# Patient Record
Sex: MALE | Race: WHITE | NOT HISPANIC OR LATINO | Employment: OTHER | URBAN - METROPOLITAN AREA
[De-identification: names, ages, dates, MRNs, and addresses within clinical notes are randomized per-mention and may not be internally consistent; named-entity substitution may affect disease eponyms.]

---

## 2023-04-20 ENCOUNTER — OFFICE VISIT (OUTPATIENT)
Dept: PRIMARY CARE | Facility: CLINIC | Age: 73
End: 2023-04-20
Payer: MEDICARE

## 2023-04-20 VITALS
HEART RATE: 84 BPM | DIASTOLIC BLOOD PRESSURE: 78 MMHG | SYSTOLIC BLOOD PRESSURE: 129 MMHG | WEIGHT: 159 LBS | OXYGEN SATURATION: 98 % | RESPIRATION RATE: 16 BRPM | HEIGHT: 70 IN | BODY MASS INDEX: 22.76 KG/M2

## 2023-04-20 DIAGNOSIS — E78.5 DYSLIPIDEMIA: ICD-10-CM

## 2023-04-20 DIAGNOSIS — F17.210 CIGARETTE NICOTINE DEPENDENCE WITHOUT COMPLICATION: Primary | ICD-10-CM

## 2023-04-20 DIAGNOSIS — J44.9 COPD, MODERATE (MULTI): ICD-10-CM

## 2023-04-20 PROBLEM — F51.01 PRIMARY INSOMNIA: Chronic | Status: ACTIVE | Noted: 2023-04-20

## 2023-04-20 PROBLEM — R97.20 ELEVATED PSA: Status: ACTIVE | Noted: 2023-04-20

## 2023-04-20 PROBLEM — J30.9 ALLERGIC RHINITIS: Status: ACTIVE | Noted: 2023-04-20

## 2023-04-20 PROBLEM — J45.909 ASTHMA (HHS-HCC): Status: RESOLVED | Noted: 2023-04-20 | Resolved: 2023-04-20

## 2023-04-20 PROBLEM — N40.0 BPH (BENIGN PROSTATIC HYPERPLASIA): Status: ACTIVE | Noted: 2023-04-20

## 2023-04-20 PROBLEM — F51.01 PRIMARY INSOMNIA: Chronic | Status: RESOLVED | Noted: 2023-04-20 | Resolved: 2023-04-20

## 2023-04-20 PROBLEM — F51.01 PRIMARY INSOMNIA: Status: ACTIVE | Noted: 2023-04-20

## 2023-04-20 PROBLEM — I10 HYPERTENSION: Status: ACTIVE | Noted: 2023-04-20

## 2023-04-20 PROBLEM — R53.83 FATIGUE: Status: ACTIVE | Noted: 2023-04-20

## 2023-04-20 PROBLEM — J45.909 ASTHMA (HHS-HCC): Status: ACTIVE | Noted: 2023-04-20

## 2023-04-20 PROBLEM — R41.3 MEMORY CHANGE: Status: ACTIVE | Noted: 2023-04-20

## 2023-04-20 PROBLEM — H90.3 SENSORINEURAL HEARING LOSS, ASYMMETRICAL: Status: ACTIVE | Noted: 2023-04-20

## 2023-04-20 PROBLEM — R31.0 GROSS HEMATURIA: Status: ACTIVE | Noted: 2023-04-20

## 2023-04-20 PROCEDURE — 1159F MED LIST DOCD IN RCRD: CPT | Performed by: STUDENT IN AN ORGANIZED HEALTH CARE EDUCATION/TRAINING PROGRAM

## 2023-04-20 PROCEDURE — 3074F SYST BP LT 130 MM HG: CPT | Performed by: STUDENT IN AN ORGANIZED HEALTH CARE EDUCATION/TRAINING PROGRAM

## 2023-04-20 PROCEDURE — 99214 OFFICE O/P EST MOD 30 MIN: CPT | Performed by: STUDENT IN AN ORGANIZED HEALTH CARE EDUCATION/TRAINING PROGRAM

## 2023-04-20 PROCEDURE — 1160F RVW MEDS BY RX/DR IN RCRD: CPT | Performed by: STUDENT IN AN ORGANIZED HEALTH CARE EDUCATION/TRAINING PROGRAM

## 2023-04-20 PROCEDURE — 3078F DIAST BP <80 MM HG: CPT | Performed by: STUDENT IN AN ORGANIZED HEALTH CARE EDUCATION/TRAINING PROGRAM

## 2023-04-20 PROCEDURE — 99406 BEHAV CHNG SMOKING 3-10 MIN: CPT | Performed by: STUDENT IN AN ORGANIZED HEALTH CARE EDUCATION/TRAINING PROGRAM

## 2023-04-20 PROCEDURE — 1157F ADVNC CARE PLAN IN RCRD: CPT | Performed by: STUDENT IN AN ORGANIZED HEALTH CARE EDUCATION/TRAINING PROGRAM

## 2023-04-20 RX ORDER — BUDESONIDE AND FORMOTEROL FUMARATE DIHYDRATE 160; 4.5 UG/1; UG/1
AEROSOL RESPIRATORY (INHALATION)
COMMUNITY
End: 2024-02-07 | Stop reason: WASHOUT

## 2023-04-20 RX ORDER — ASPIRIN 81 MG/1
1 TABLET ORAL DAILY
COMMUNITY

## 2023-04-20 RX ORDER — ALBUTEROL SULFATE 90 UG/1
AEROSOL, METERED RESPIRATORY (INHALATION) EVERY 6 HOURS
COMMUNITY
Start: 2021-08-02

## 2023-04-20 RX ORDER — ATORVASTATIN CALCIUM 20 MG/1
20 TABLET, FILM COATED ORAL DAILY
COMMUNITY
End: 2023-10-26 | Stop reason: SDUPTHER

## 2023-04-20 ASSESSMENT — ENCOUNTER SYMPTOMS
DEPRESSION: 0
OCCASIONAL FEELINGS OF UNSTEADINESS: 0
LOSS OF SENSATION IN FEET: 0
SHORTNESS OF BREATH: 0
COUGH: 0
PALPITATIONS: 0
FATIGUE: 1

## 2023-04-20 ASSESSMENT — PATIENT HEALTH QUESTIONNAIRE - PHQ9
SUM OF ALL RESPONSES TO PHQ9 QUESTIONS 1 AND 2: 0
1. LITTLE INTEREST OR PLEASURE IN DOING THINGS: NOT AT ALL
2. FEELING DOWN, DEPRESSED OR HOPELESS: NOT AT ALL

## 2023-04-20 NOTE — ASSESSMENT & PLAN NOTE
PFTS in 2021- FEV1/FVC 57, FEV1 49-61 significant BDR,   Managed with Symbicort daily and PRN albuterol inhaler  No recent flares  Taking daily vicks and states it helps  Counseled on pneumonia vaccine-declines

## 2023-04-20 NOTE — ASSESSMENT & PLAN NOTE
Counseled on long term negative health impacts on health if tobacco use continued.  Reviewed options for cessation aid including patches, lozenges, Wellbutrin, Chantix.  Discussed motivational factors to improve chances for success.  Time Spent: 5  Orders:

## 2023-04-20 NOTE — PROGRESS NOTES
"Patient Name:  James Zhu  MRN:  [id number]  :  1950    Subjective   Patient ID: James Zhu is a 72 y.o. male who presents for Transfer Of Care.    HPI     71 yo male presents    R ear troubles  Painful, upper tragus has been trying to \"get it out\"   Hearing long term loss    BP a little high today     Rolls his own cigarettes  Has not tried nicotine patches  Smoking since young age    Review of Systems   Constitutional:  Positive for fatigue.   HENT:  Positive for ear pain. Negative for congestion.    Eyes:  Negative for visual disturbance.   Respiratory:  Negative for cough and shortness of breath.    Cardiovascular:  Negative for chest pain, palpitations and leg swelling.   Allergic/Immunologic: Negative for immunocompromised state.     Objective   /78   Pulse 84   Resp 16   Ht 1.778 m (5' 10\")   Wt 72.1 kg (159 lb)   SpO2 98%   BMI 22.81 kg/m²     Physical Exam  Constitutional:       Appearance: Normal appearance.   HENT:      Head: Atraumatic.        Comments: Small white head, removed      Right Ear: Tympanic membrane normal.      Left Ear: Tympanic membrane normal.   Cardiovascular:      Rate and Rhythm: Normal rate.   Pulmonary:      Effort: Pulmonary effort is normal. No respiratory distress.      Breath sounds: No wheezing.   Neurological:      General: No focal deficit present.      Mental Status: He is alert and oriented to person, place, and time.   Psychiatric:         Mood and Affect: Mood normal.         Behavior: Behavior normal.     Assessment/Plan   Problem List Items Addressed This Visit          Respiratory    COPD, moderate (CMS/HCC) (Chronic)     PFTS in - FEV1/FVC 57, FEV1 49-61 significant BDR,   Managed with Symbicort daily and PRN albuterol inhaler  No recent flares  Taking daily vicks and states it helps  Counseled on pneumonia vaccine-declines            Other    Cigarette nicotine dependence without complication - Primary     Counseled on long term " negative health impacts on health if tobacco use continued.  Reviewed options for cessation aid including patches, lozenges, Wellbutrin, Chantix.  Discussed motivational factors to improve chances for success.  Time Spent: 5  Orders:            Dyslipidemia     Continue atorvastatin 20mg

## 2023-10-26 ENCOUNTER — OFFICE VISIT (OUTPATIENT)
Dept: PRIMARY CARE | Facility: CLINIC | Age: 73
End: 2023-10-26
Payer: MEDICARE

## 2023-10-26 ENCOUNTER — HOSPITAL ENCOUNTER (OUTPATIENT)
Dept: RADIOLOGY | Facility: HOSPITAL | Age: 73
Discharge: HOME | End: 2023-10-26
Payer: MEDICARE

## 2023-10-26 VITALS
OXYGEN SATURATION: 97 % | SYSTOLIC BLOOD PRESSURE: 112 MMHG | RESPIRATION RATE: 16 BRPM | HEART RATE: 72 BPM | BODY MASS INDEX: 22.05 KG/M2 | DIASTOLIC BLOOD PRESSURE: 70 MMHG | WEIGHT: 154 LBS | HEIGHT: 70 IN

## 2023-10-26 DIAGNOSIS — E78.5 DYSLIPIDEMIA: ICD-10-CM

## 2023-10-26 DIAGNOSIS — Z98.890 S/P FOOT SURGERY, RIGHT: ICD-10-CM

## 2023-10-26 DIAGNOSIS — J44.9 COPD, MODERATE (MULTI): ICD-10-CM

## 2023-10-26 DIAGNOSIS — Z00.00 MEDICARE ANNUAL WELLNESS VISIT, SUBSEQUENT: Primary | ICD-10-CM

## 2023-10-26 DIAGNOSIS — H53.9 VISION CHANGES: ICD-10-CM

## 2023-10-26 DIAGNOSIS — R97.20 ELEVATED PSA: ICD-10-CM

## 2023-10-26 PROCEDURE — 73620 X-RAY EXAM OF FOOT: CPT | Mod: RT,FY

## 2023-10-26 PROCEDURE — 3078F DIAST BP <80 MM HG: CPT | Performed by: STUDENT IN AN ORGANIZED HEALTH CARE EDUCATION/TRAINING PROGRAM

## 2023-10-26 PROCEDURE — 3074F SYST BP LT 130 MM HG: CPT | Performed by: STUDENT IN AN ORGANIZED HEALTH CARE EDUCATION/TRAINING PROGRAM

## 2023-10-26 PROCEDURE — 1159F MED LIST DOCD IN RCRD: CPT | Performed by: STUDENT IN AN ORGANIZED HEALTH CARE EDUCATION/TRAINING PROGRAM

## 2023-10-26 PROCEDURE — G0439 PPPS, SUBSEQ VISIT: HCPCS | Performed by: STUDENT IN AN ORGANIZED HEALTH CARE EDUCATION/TRAINING PROGRAM

## 2023-10-26 PROCEDURE — 73620 X-RAY EXAM OF FOOT: CPT | Mod: RIGHT SIDE | Performed by: RADIOLOGY

## 2023-10-26 PROCEDURE — 99397 PER PM REEVAL EST PAT 65+ YR: CPT | Performed by: STUDENT IN AN ORGANIZED HEALTH CARE EDUCATION/TRAINING PROGRAM

## 2023-10-26 PROCEDURE — 99214 OFFICE O/P EST MOD 30 MIN: CPT | Performed by: STUDENT IN AN ORGANIZED HEALTH CARE EDUCATION/TRAINING PROGRAM

## 2023-10-26 PROCEDURE — 1160F RVW MEDS BY RX/DR IN RCRD: CPT | Performed by: STUDENT IN AN ORGANIZED HEALTH CARE EDUCATION/TRAINING PROGRAM

## 2023-10-26 PROCEDURE — 99406 BEHAV CHNG SMOKING 3-10 MIN: CPT | Performed by: STUDENT IN AN ORGANIZED HEALTH CARE EDUCATION/TRAINING PROGRAM

## 2023-10-26 PROCEDURE — 1170F FXNL STATUS ASSESSED: CPT | Performed by: STUDENT IN AN ORGANIZED HEALTH CARE EDUCATION/TRAINING PROGRAM

## 2023-10-26 RX ORDER — TIOTROPIUM BROMIDE INHALATION SPRAY 1.56 UG/1
1 SPRAY, METERED RESPIRATORY (INHALATION) DAILY
Qty: 1 EACH | Refills: 11 | Status: SHIPPED | OUTPATIENT
Start: 2023-10-26 | End: 2024-02-07 | Stop reason: WASHOUT

## 2023-10-26 RX ORDER — ATORVASTATIN CALCIUM 20 MG/1
20 TABLET, FILM COATED ORAL DAILY
Qty: 90 TABLET | Refills: 1 | Status: SHIPPED | OUTPATIENT
Start: 2023-10-26 | End: 2024-02-07 | Stop reason: SDUPTHER

## 2023-10-26 RX ORDER — MELOXICAM 15 MG/1
15 TABLET ORAL DAILY PRN
Qty: 90 TABLET | Refills: 0 | Status: SHIPPED | OUTPATIENT
Start: 2023-10-26 | End: 2024-03-29 | Stop reason: SDUPTHER

## 2023-10-26 ASSESSMENT — ACTIVITIES OF DAILY LIVING (ADL)
BATHING: INDEPENDENT
DRESSING: INDEPENDENT
GROCERY_SHOPPING: INDEPENDENT
TAKING_MEDICATION: INDEPENDENT
MANAGING_FINANCES: INDEPENDENT
DOING_HOUSEWORK: INDEPENDENT

## 2023-10-26 ASSESSMENT — ENCOUNTER SYMPTOMS
WHEEZING: 1
FATIGUE: 1
CARDIOVASCULAR NEGATIVE: 1
PALPITATIONS: 0
DEPRESSION: 0
CONFUSION: 0
FEVER: 0
UNEXPECTED WEIGHT CHANGE: 0
LOSS OF SENSATION IN FEET: 0
OCCASIONAL FEELINGS OF UNSTEADINESS: 0
SHORTNESS OF BREATH: 0
ARTHRALGIAS: 1

## 2023-10-26 NOTE — ASSESSMENT & PLAN NOTE
Counseled on long term negative health impacts on health if tobacco use continued.  Reviewed options for cessation aid including patches, lozenges, Wellbutrin, Chantix.  Discussed motivational factors to improve chances for success.  Time Spent: 3  Orders: none, not ready to quick

## 2023-10-26 NOTE — ASSESSMENT & PLAN NOTE
PNEUMONIA vaccine- Declined  SHINGLES vaccine- Declined  INFLUENZA vaccine-Declined  Screening tests:  Colon cancer screening--> Completed 2022, not yet due  Prostate Cancer Screening--> last ordered 2 years ago and elevated, ordered  During the course of the visit the patient was educated and counseled about age appropriate screening and preventive services. Completed preventive screenings were documented in the chart and orders were placed for outstanding screenings/procedures as documented in the Assessment and Plan.  Patient Instructions (the written plan) was given to the patient at check out.

## 2023-10-26 NOTE — PATIENT INSTRUCTIONS
Xray of foot please complete  Try mobic for pain   Ophthalmology referral for eyes  Will send in new inhaler

## 2023-10-26 NOTE — PROGRESS NOTES
"Subjective   Reason for Visit: James Zhu is an 73 y.o. male here for a Medicare Wellness visit.     Past Medical, Surgical, and Family History reviewed and updated in chart.    Reviewed all medications by prescribing practitioner or clinical pharmacist (such as prescriptions, OTCs, herbal therapies and supplements) and documented in the medical record.    HPI    72 yo male presents for medicare and several new concerns    Vision changes- given bifocals from optometry 1 year ago and can't wear did not think that they help  Blurriness has to lean back and squint to get in focus  Insurance will not pay to go back to optometry   Trouble driving due to these issues    Pain where screw in foot placed  Extreme if pressure  Limping due to pain     Feels like COPD is worsening     Patient Care Team:  Flavia Hightower DO as PCP - General (Family Medicine)  Kevyn Ludwig MD as PCP - [employer] PCP     Review of Systems   Constitutional:  Positive for fatigue. Negative for fever and unexpected weight change.   Eyes:  Negative for visual disturbance.   Respiratory:  Positive for wheezing. Negative for shortness of breath.    Cardiovascular: Negative.  Negative for chest pain, palpitations and leg swelling.   Musculoskeletal:  Positive for arthralgias and gait problem.   Allergic/Immunologic: Negative for immunocompromised state.   Psychiatric/Behavioral:  Negative for confusion.      Objective   Vitals:  /70   Pulse 72   Resp 16   Ht 1.778 m (5' 10\")   Wt 69.9 kg (154 lb)   SpO2 97%   BMI 22.10 kg/m²       Physical Exam  Constitutional:       General: He is not in acute distress.     Appearance: Normal appearance. He is not ill-appearing.   HENT:      Head: Normocephalic and atraumatic.      Right Ear: Hearing normal.      Left Ear: Hearing normal.      Mouth/Throat:      Pharynx: No oropharyngeal exudate or posterior oropharyngeal erythema.   Eyes:      Extraocular Movements: Extraocular " movements intact.   Cardiovascular:      Rate and Rhythm: Normal rate and regular rhythm.      Pulses:           Dorsalis pedis pulses are 2+ on the right side.        Posterior tibial pulses are 2+ on the right side.   Pulmonary:      Effort: Pulmonary effort is normal. No respiratory distress.   Abdominal:      Tenderness: There is no abdominal tenderness.   Musculoskeletal:      Cervical back: No tenderness.      Right lower leg: No edema.      Left lower leg: No edema.        Feet:    Feet:      Comments: Localized pain to palpation  No Rom, sensation, or strength deficits  Skin:     General: Skin is warm and dry.   Neurological:      General: No focal deficit present.      Mental Status: He is alert and oriented to person, place, and time.   Psychiatric:         Mood and Affect: Mood normal.         Behavior: Behavior normal.     Assessment/Plan   Problem List Items Addressed This Visit       COPD, moderate (CMS/HCC) (Chronic)    Current Assessment & Plan     July 2021 with FEV1/FVC 57, FEV1 49-61  Has PRN albuterol  Taking symbicort, will add respimat            Relevant Medications    tiotropium (Spiriva Respimat) 1.25 mcg/actuation inhaler    Other Relevant Orders    Pulmonary function testing (Ancillary Performed)    Follow Up In [facility] - PCP - Established    CBC and Auto Differential    Dyslipidemia (Chronic)    Relevant Medications    atorvastatin (Lipitor) 20 mg tablet    Other Relevant Orders    Lipid Panel    Basic metabolic panel    CBC and Auto Differential    Elevated PSA (Chronic)    Relevant Orders    PSA, total and free    Medicare annual wellness visit, subsequent - Primary    Current Assessment & Plan     PNEUMONIA vaccine- Declined  SHINGLES vaccine- Declined  INFLUENZA vaccine-Declined  Screening tests:  Colon cancer screening--> Completed 2022, not yet due  Prostate Cancer Screening--> last ordered 2 years ago and elevated, ordered  During the course of the visit the patient  was educated and counseled about age appropriate screening and preventive services. Completed preventive screenings were documented in the chart and orders were placed for outstanding screenings/procedures as documented in the Assessment and Plan.  Patient Instructions (the written plan) was given to the patient at check out.           S/P foot surgery, right    Relevant Medications    meloxicam (Mobic) 15 mg tablet    Other Relevant Orders    XR foot right 1-2 views     Other Visit Diagnoses       Vision changes        Relevant Orders    Referral to Ophthalmology

## 2023-10-30 ENCOUNTER — TELEPHONE (OUTPATIENT)
Dept: PRIMARY CARE | Facility: CLINIC | Age: 73
End: 2023-10-30
Payer: MEDICARE

## 2023-10-30 DIAGNOSIS — J44.9 COPD, MODERATE (MULTI): Primary | ICD-10-CM

## 2023-10-30 NOTE — TELEPHONE ENCOUNTER
Patient aware of results also patient is requesting a new inhaler to be sent in as he can not afford the one you previously sent in.

## 2023-10-30 NOTE — TELEPHONE ENCOUNTER
I spoke with patient he is aware and also stated that he can not afford the inhaler you sent in for him and he is asking for a different one to be called in.

## 2023-10-30 NOTE — TELEPHONE ENCOUNTER
----- Message from Flavia Hightower DO sent at 10/30/2023  7:41 AM EDT -----  Xray shows hardware is without complication, no fractures noted either. Trialing 15mg mobic, taking with food.

## 2024-01-30 DIAGNOSIS — J44.9 COPD, MODERATE (MULTI): ICD-10-CM

## 2024-01-31 ENCOUNTER — APPOINTMENT (OUTPATIENT)
Dept: OPHTHALMOLOGY | Facility: CLINIC | Age: 74
End: 2024-01-31
Payer: MEDICARE

## 2024-01-31 RX ORDER — UMECLIDINIUM 62.5 UG/1
1 AEROSOL, POWDER ORAL DAILY
Qty: 90 EACH | Refills: 3 | Status: SHIPPED | OUTPATIENT
Start: 2024-01-31 | End: 2024-02-07 | Stop reason: SDUPTHER

## 2024-02-07 ENCOUNTER — OFFICE VISIT (OUTPATIENT)
Dept: PRIMARY CARE | Facility: CLINIC | Age: 74
End: 2024-02-07
Payer: MEDICARE

## 2024-02-07 VITALS
RESPIRATION RATE: 16 BRPM | SYSTOLIC BLOOD PRESSURE: 122 MMHG | WEIGHT: 188 LBS | HEART RATE: 59 BPM | OXYGEN SATURATION: 98 % | DIASTOLIC BLOOD PRESSURE: 68 MMHG | HEIGHT: 70 IN | BODY MASS INDEX: 26.92 KG/M2

## 2024-02-07 DIAGNOSIS — M79.673 CHRONIC FOOT PAIN, UNSPECIFIED LATERALITY: Chronic | ICD-10-CM

## 2024-02-07 DIAGNOSIS — E78.5 DYSLIPIDEMIA: ICD-10-CM

## 2024-02-07 DIAGNOSIS — I73.9 CLAUDICATION (CMS-HCC): ICD-10-CM

## 2024-02-07 DIAGNOSIS — R68.89 COLD INTOLERANCE: Primary | ICD-10-CM

## 2024-02-07 DIAGNOSIS — Z98.890 S/P FOOT SURGERY, RIGHT: ICD-10-CM

## 2024-02-07 DIAGNOSIS — G89.29 CHRONIC FOOT PAIN, UNSPECIFIED LATERALITY: Chronic | ICD-10-CM

## 2024-02-07 DIAGNOSIS — J44.9 COPD, MODERATE (MULTI): ICD-10-CM

## 2024-02-07 PROBLEM — F17.210 CIGARETTE NICOTINE DEPENDENCE WITHOUT COMPLICATION: Chronic | Status: ACTIVE | Noted: 2023-04-20

## 2024-02-07 PROCEDURE — 99214 OFFICE O/P EST MOD 30 MIN: CPT | Performed by: STUDENT IN AN ORGANIZED HEALTH CARE EDUCATION/TRAINING PROGRAM

## 2024-02-07 PROCEDURE — 1159F MED LIST DOCD IN RCRD: CPT | Performed by: STUDENT IN AN ORGANIZED HEALTH CARE EDUCATION/TRAINING PROGRAM

## 2024-02-07 PROCEDURE — 3074F SYST BP LT 130 MM HG: CPT | Performed by: STUDENT IN AN ORGANIZED HEALTH CARE EDUCATION/TRAINING PROGRAM

## 2024-02-07 PROCEDURE — 3078F DIAST BP <80 MM HG: CPT | Performed by: STUDENT IN AN ORGANIZED HEALTH CARE EDUCATION/TRAINING PROGRAM

## 2024-02-07 PROCEDURE — 99406 BEHAV CHNG SMOKING 3-10 MIN: CPT | Performed by: STUDENT IN AN ORGANIZED HEALTH CARE EDUCATION/TRAINING PROGRAM

## 2024-02-07 PROCEDURE — 1157F ADVNC CARE PLAN IN RCRD: CPT | Performed by: STUDENT IN AN ORGANIZED HEALTH CARE EDUCATION/TRAINING PROGRAM

## 2024-02-07 PROCEDURE — 1160F RVW MEDS BY RX/DR IN RCRD: CPT | Performed by: STUDENT IN AN ORGANIZED HEALTH CARE EDUCATION/TRAINING PROGRAM

## 2024-02-07 RX ORDER — ATORVASTATIN CALCIUM 20 MG/1
20 TABLET, FILM COATED ORAL DAILY
Qty: 90 TABLET | Refills: 3 | Status: SHIPPED | OUTPATIENT
Start: 2024-02-07

## 2024-02-07 RX ORDER — UMECLIDINIUM 62.5 UG/1
1 AEROSOL, POWDER ORAL DAILY
Qty: 90 EACH | Refills: 3 | Status: SHIPPED | OUTPATIENT
Start: 2024-02-07 | End: 2024-06-07 | Stop reason: WASHOUT

## 2024-02-07 ASSESSMENT — PATIENT HEALTH QUESTIONNAIRE - PHQ9
1. LITTLE INTEREST OR PLEASURE IN DOING THINGS: NOT AT ALL
2. FEELING DOWN, DEPRESSED OR HOPELESS: NOT AT ALL
SUM OF ALL RESPONSES TO PHQ9 QUESTIONS 1 AND 2: 0

## 2024-02-07 ASSESSMENT — ENCOUNTER SYMPTOMS
SHORTNESS OF BREATH: 0
CONFUSION: 0
WHEEZING: 0
LOSS OF SENSATION IN FEET: 0
FATIGUE: 0
FACIAL ASYMMETRY: 0
OCCASIONAL FEELINGS OF UNSTEADINESS: 0
FEVER: 0
HEADACHES: 0
ARTHRALGIAS: 1
COUGH: 0
DEPRESSION: 0

## 2024-02-07 NOTE — ASSESSMENT & PLAN NOTE
Counseled on long term negative health impacts on health if tobacco use continued.  Discussed motivational factors to improve chances for success- not interested at this time  Time Spent: 3  Orders: not interested at this time with cessation

## 2024-02-07 NOTE — ASSESSMENT & PLAN NOTE
We will work on scheduling PFTs today  Maintanece- Respimat was too expensive so now on Incruse   PRN- albuterol   No recent hospitalizations, steroid use  PFTS last in 2021- Mod level   Not interested in smoking cessation

## 2024-02-07 NOTE — PROGRESS NOTES
"Patient Name:  James Zhu  MRN:  [id number]  :  1950    Subjective   Patient ID: James Zhu is a 73 y.o. male who presents for Follow-up (Can't get warm still pain in toes /).    HPI     72 yo male presents for follow up    Concern that he has trouble getting warm   He was without heat for 20 days in January and feels like issues since     Sometimes gets pain in his toes  Both sides  Intermittent   But worse with walking    Review of Systems   Constitutional:  Negative for fatigue and fever.   HENT:  Negative for congestion.    Respiratory:  Negative for cough, shortness of breath and wheezing.    Endocrine: Positive for cold intolerance. Negative for heat intolerance.   Musculoskeletal:  Positive for arthralgias.   Allergic/Immunologic: Negative for immunocompromised state.   Neurological:  Negative for facial asymmetry and headaches.   Psychiatric/Behavioral:  Negative for confusion.      Objective   /68   Pulse 59   Resp 16   Ht 1.778 m (5' 10\")   Wt 85.3 kg (188 lb)   SpO2 98%   BMI 26.98 kg/m²     Physical Exam  Constitutional:       Appearance: Normal appearance.   Cardiovascular:      Rate and Rhythm: Normal rate and regular rhythm.      Comments: Bilateral feet significant varicosities  Pulses 2/4 on DP bilaterally   Neurological:      Mental Status: He is alert and oriented to person, place, and time.      Sensory: No sensory deficit.   Psychiatric:         Mood and Affect: Mood normal.         Behavior: Behavior normal.     Assessment/Plan   Problem List Items Addressed This Visit             ICD-10-CM    COPD, moderate (CMS/HCC) (Chronic) J44.9     We will work on scheduling PFTs today  Maintanece- Respimat was too expensive so now on Incruse   PRN- albuterol   No recent hospitalizations, steroid use  PFTS last in - Mod level   Not interested in smoking cessation          Relevant Medications    umeclidinium (Incruse Ellipta) 62.5 mcg/actuation inhalation    Other Relevant Orders "    Follow Up In [facility] - PCP - Established    Dyslipidemia (Chronic) E78.5    Relevant Medications    atorvastatin (Lipitor) 20 mg tablet    Chronic foot pain (Chronic) M79.673, G89.29     Known OA following with podiatry  Mobic for this issue  Signficiant varicosities could be venous insuffiencey  With worsening with walking rule out arterial disease with SERA  Consider EMG testing if symptoms persist           Other Visit Diagnoses         Codes    Cold intolerance    -  Primary R68.89    Relevant Orders    Tsh With Reflex To Free T4 If Abnormal    Claudication (CMS/[facility])     I73.9    Relevant Orders    Vascular US ankle brachial index (SERA) without exercise

## 2024-02-07 NOTE — ASSESSMENT & PLAN NOTE
Known OA following with podiatry  Mobic for this issue  Signficiant varicosities could be venous insuffiencey  With worsening with walking rule out arterial disease with SERA  Consider EMG testing if symptoms persist

## 2024-02-21 ENCOUNTER — HOSPITAL ENCOUNTER (OUTPATIENT)
Dept: VASCULAR MEDICINE | Facility: HOSPITAL | Age: 74
Discharge: HOME | End: 2024-02-21
Payer: MEDICARE

## 2024-02-21 ENCOUNTER — HOSPITAL ENCOUNTER (OUTPATIENT)
Dept: RESPIRATORY THERAPY | Facility: HOSPITAL | Age: 74
Discharge: HOME | End: 2024-02-21
Payer: MEDICARE

## 2024-02-21 DIAGNOSIS — J44.9 COPD, MODERATE (MULTI): ICD-10-CM

## 2024-02-21 DIAGNOSIS — I73.9 CLAUDICATION (CMS-HCC): ICD-10-CM

## 2024-02-21 LAB
MGC ASCENT PFT - FEV1 - POST: 2.07
MGC ASCENT PFT - FEV1 - PRE: 1.72
MGC ASCENT PFT - FEV1 - PREDICTED: 3.02
MGC ASCENT PFT - FVC - POST: 4.42
MGC ASCENT PFT - FVC - PRE: 4.01
MGC ASCENT PFT - FVC - PREDICTED: 4.02

## 2024-02-21 PROCEDURE — 94726 PLETHYSMOGRAPHY LUNG VOLUMES: CPT | Performed by: STUDENT IN AN ORGANIZED HEALTH CARE EDUCATION/TRAINING PROGRAM

## 2024-02-21 PROCEDURE — 93922 UPR/L XTREMITY ART 2 LEVELS: CPT | Performed by: INTERNAL MEDICINE

## 2024-02-21 PROCEDURE — 94640 AIRWAY INHALATION TREATMENT: CPT

## 2024-02-21 PROCEDURE — 94729 DIFFUSING CAPACITY: CPT | Performed by: STUDENT IN AN ORGANIZED HEALTH CARE EDUCATION/TRAINING PROGRAM

## 2024-02-21 PROCEDURE — 2500000002 HC RX 250 W HCPCS SELF ADMINISTERED DRUGS (ALT 637 FOR MEDICARE OP, ALT 636 FOR OP/ED): Performed by: STUDENT IN AN ORGANIZED HEALTH CARE EDUCATION/TRAINING PROGRAM

## 2024-02-21 PROCEDURE — 94726 PLETHYSMOGRAPHY LUNG VOLUMES: CPT | Mod: 59

## 2024-02-21 PROCEDURE — 93922 UPR/L XTREMITY ART 2 LEVELS: CPT

## 2024-02-21 PROCEDURE — 94060 EVALUATION OF WHEEZING: CPT | Performed by: STUDENT IN AN ORGANIZED HEALTH CARE EDUCATION/TRAINING PROGRAM

## 2024-02-21 RX ORDER — ALBUTEROL SULFATE 90 UG/1
4 AEROSOL, METERED RESPIRATORY (INHALATION) ONCE
Status: COMPLETED | OUTPATIENT
Start: 2024-02-21 | End: 2024-02-21

## 2024-02-21 RX ADMIN — ALBUTEROL SULFATE 4 PUFF: 90 AEROSOL, METERED RESPIRATORY (INHALATION) at 15:09

## 2024-02-22 ENCOUNTER — TELEPHONE (OUTPATIENT)
Dept: PRIMARY CARE | Facility: CLINIC | Age: 74
End: 2024-02-22
Payer: MEDICARE

## 2024-02-22 NOTE — TELEPHONE ENCOUNTER
----- Message from Flavia Hightower DO sent at 2/21/2024  3:59 PM EST -----  Moderate level of COPD on testing with response to bronchodilator, he also had moderately reduced diffusion capacity.

## 2024-02-22 NOTE — TELEPHONE ENCOUNTER
----- Message from Flavia Hightower DO sent at 2/22/2024  6:59 AM EST -----  Normal SERA, this means arterial insufficiency is not the cause for his discomfort in his feet. Our next test will be on EMG to see if it is an issue with his nerves, if this is also negative then pain is secondary to his osteoarthritis.

## 2024-02-23 ENCOUNTER — OFFICE VISIT (OUTPATIENT)
Dept: UROLOGY | Facility: CLINIC | Age: 74
End: 2024-02-23
Payer: MEDICARE

## 2024-02-23 ENCOUNTER — APPOINTMENT (OUTPATIENT)
Dept: RADIOLOGY | Facility: HOSPITAL | Age: 74
End: 2024-02-23
Payer: MEDICARE

## 2024-02-23 ENCOUNTER — HOSPITAL ENCOUNTER (EMERGENCY)
Facility: HOSPITAL | Age: 74
Discharge: HOME | End: 2024-02-24
Attending: STUDENT IN AN ORGANIZED HEALTH CARE EDUCATION/TRAINING PROGRAM
Payer: MEDICARE

## 2024-02-23 DIAGNOSIS — R10.31 RIGHT LOWER QUADRANT ABDOMINAL PAIN: ICD-10-CM

## 2024-02-23 DIAGNOSIS — N40.1 BENIGN PROSTATIC HYPERPLASIA WITH LOWER URINARY TRACT SYMPTOMS, SYMPTOM DETAILS UNSPECIFIED: Primary | ICD-10-CM

## 2024-02-23 DIAGNOSIS — K30 ACID INDIGESTION: Primary | ICD-10-CM

## 2024-02-23 DIAGNOSIS — R97.20 ELEVATED PSA: ICD-10-CM

## 2024-02-23 LAB
ALBUMIN SERPL BCP-MCNC: 4.7 G/DL (ref 3.4–5)
ALP SERPL-CCNC: 85 U/L (ref 33–136)
ALT SERPL W P-5'-P-CCNC: 21 U/L (ref 10–52)
ANION GAP BLDV CALCULATED.4IONS-SCNC: 7 MMOL/L (ref 10–25)
ANION GAP SERPL CALC-SCNC: 15 MMOL/L (ref 10–20)
APTT PPP: 35 SECONDS (ref 27–38)
AST SERPL W P-5'-P-CCNC: 21 U/L (ref 9–39)
BASE EXCESS BLDV CALC-SCNC: 2.8 MMOL/L (ref -2–3)
BASOPHILS # BLD AUTO: 0.05 X10*3/UL (ref 0–0.1)
BASOPHILS NFR BLD AUTO: 0.5 %
BILIRUB SERPL-MCNC: 0.5 MG/DL (ref 0–1.2)
BNP SERPL-MCNC: 24 PG/ML (ref 0–99)
BODY TEMPERATURE: 37 DEGREES CELSIUS
BUN SERPL-MCNC: 11 MG/DL (ref 6–23)
CA-I BLDV-SCNC: 1.19 MMOL/L (ref 1.1–1.33)
CALCIUM SERPL-MCNC: 9.9 MG/DL (ref 8.6–10.3)
CARDIAC TROPONIN I PNL SERPL HS: 8 NG/L (ref 0–20)
CHLORIDE BLDV-SCNC: 105 MMOL/L (ref 98–107)
CHLORIDE SERPL-SCNC: 103 MMOL/L (ref 98–107)
CO2 SERPL-SCNC: 25 MMOL/L (ref 21–32)
CREAT SERPL-MCNC: 0.97 MG/DL (ref 0.5–1.3)
EGFRCR SERPLBLD CKD-EPI 2021: 83 ML/MIN/1.73M*2
EOSINOPHIL # BLD AUTO: 0.16 X10*3/UL (ref 0–0.4)
EOSINOPHIL NFR BLD AUTO: 1.5 %
ERYTHROCYTE [DISTWIDTH] IN BLOOD BY AUTOMATED COUNT: 13.2 % (ref 11.5–14.5)
GLUCOSE BLDV-MCNC: 97 MG/DL (ref 74–99)
GLUCOSE SERPL-MCNC: 96 MG/DL (ref 74–99)
HCO3 BLDV-SCNC: 27.2 MMOL/L (ref 22–26)
HCT VFR BLD AUTO: 48.4 % (ref 41–52)
HCT VFR BLD EST: 43 % (ref 41–52)
HGB BLD-MCNC: 16.1 G/DL (ref 13.5–17.5)
HGB BLDV-MCNC: 14.2 G/DL (ref 13.5–17.5)
IMM GRANULOCYTES # BLD AUTO: 0.09 X10*3/UL (ref 0–0.5)
IMM GRANULOCYTES NFR BLD AUTO: 0.8 % (ref 0–0.9)
INHALED O2 CONCENTRATION: 21 %
INR PPP: 1 (ref 0.9–1.1)
LACTATE BLDV-SCNC: 1.9 MMOL/L (ref 0.4–2)
LACTATE SERPL-SCNC: 4.2 MMOL/L (ref 0.4–2)
LYMPHOCYTES # BLD AUTO: 2.73 X10*3/UL (ref 0.8–3)
LYMPHOCYTES NFR BLD AUTO: 25.1 %
MAGNESIUM SERPL-MCNC: 2.11 MG/DL (ref 1.6–2.4)
MCH RBC QN AUTO: 33 PG (ref 26–34)
MCHC RBC AUTO-ENTMCNC: 33.3 G/DL (ref 32–36)
MCV RBC AUTO: 99 FL (ref 80–100)
MONOCYTES # BLD AUTO: 0.92 X10*3/UL (ref 0.05–0.8)
MONOCYTES NFR BLD AUTO: 8.5 %
NEUTROPHILS # BLD AUTO: 6.93 X10*3/UL (ref 1.6–5.5)
NEUTROPHILS NFR BLD AUTO: 63.6 %
NRBC BLD-RTO: 0 /100 WBCS (ref 0–0)
OXYHGB MFR BLDV: 66.4 % (ref 45–75)
PCO2 BLDV: 40 MM HG (ref 41–51)
PH BLDV: 7.44 PH (ref 7.33–7.43)
PLATELET # BLD AUTO: 219 X10*3/UL (ref 150–450)
PO2 BLDV: 44 MM HG (ref 35–45)
POC BILIRUBIN, URINE: NEGATIVE
POC BLOOD, URINE: ABNORMAL
POC GLUCOSE, URINE: NEGATIVE MG/DL
POC KETONES, URINE: NEGATIVE MG/DL
POC LEUKOCYTES, URINE: ABNORMAL
POC NITRITE,URINE: NEGATIVE
POC PH, URINE: 6.5 PH
POC PROTEIN, URINE: ABNORMAL MG/DL
POC SPECIFIC GRAVITY, URINE: 1.02
POC UROBILINOGEN, URINE: 0.2 EU/DL
POTASSIUM BLDV-SCNC: 3.9 MMOL/L (ref 3.5–5.3)
POTASSIUM SERPL-SCNC: 3.9 MMOL/L (ref 3.5–5.3)
PROT SERPL-MCNC: 8 G/DL (ref 6.4–8.2)
PROTHROMBIN TIME: 11.5 SECONDS (ref 9.8–12.8)
RBC # BLD AUTO: 4.88 X10*6/UL (ref 4.5–5.9)
SAO2 % BLDV: 72 % (ref 45–75)
SODIUM BLDV-SCNC: 135 MMOL/L (ref 136–145)
SODIUM SERPL-SCNC: 139 MMOL/L (ref 136–145)
WBC # BLD AUTO: 10.9 X10*3/UL (ref 4.4–11.3)

## 2024-02-23 PROCEDURE — 86901 BLOOD TYPING SEROLOGIC RH(D): CPT | Performed by: STUDENT IN AN ORGANIZED HEALTH CARE EDUCATION/TRAINING PROGRAM

## 2024-02-23 PROCEDURE — 71275 CT ANGIOGRAPHY CHEST: CPT | Performed by: RADIOLOGY

## 2024-02-23 PROCEDURE — 36415 COLL VENOUS BLD VENIPUNCTURE: CPT | Performed by: STUDENT IN AN ORGANIZED HEALTH CARE EDUCATION/TRAINING PROGRAM

## 2024-02-23 PROCEDURE — 84132 ASSAY OF SERUM POTASSIUM: CPT | Mod: 59 | Performed by: STUDENT IN AN ORGANIZED HEALTH CARE EDUCATION/TRAINING PROGRAM

## 2024-02-23 PROCEDURE — 1160F RVW MEDS BY RX/DR IN RCRD: CPT | Performed by: UROLOGY

## 2024-02-23 PROCEDURE — 1157F ADVNC CARE PLAN IN RCRD: CPT | Performed by: UROLOGY

## 2024-02-23 PROCEDURE — 83605 ASSAY OF LACTIC ACID: CPT | Performed by: STUDENT IN AN ORGANIZED HEALTH CARE EDUCATION/TRAINING PROGRAM

## 2024-02-23 PROCEDURE — 2550000001 HC RX 255 CONTRASTS: Performed by: STUDENT IN AN ORGANIZED HEALTH CARE EDUCATION/TRAINING PROGRAM

## 2024-02-23 PROCEDURE — 83735 ASSAY OF MAGNESIUM: CPT | Performed by: STUDENT IN AN ORGANIZED HEALTH CARE EDUCATION/TRAINING PROGRAM

## 2024-02-23 PROCEDURE — 99285 EMERGENCY DEPT VISIT HI MDM: CPT | Mod: 25

## 2024-02-23 PROCEDURE — 80053 COMPREHEN METABOLIC PANEL: CPT | Performed by: STUDENT IN AN ORGANIZED HEALTH CARE EDUCATION/TRAINING PROGRAM

## 2024-02-23 PROCEDURE — 71275 CT ANGIOGRAPHY CHEST: CPT

## 2024-02-23 PROCEDURE — 1159F MED LIST DOCD IN RCRD: CPT | Performed by: UROLOGY

## 2024-02-23 PROCEDURE — 96374 THER/PROPH/DIAG INJ IV PUSH: CPT

## 2024-02-23 PROCEDURE — 85610 PROTHROMBIN TIME: CPT | Performed by: STUDENT IN AN ORGANIZED HEALTH CARE EDUCATION/TRAINING PROGRAM

## 2024-02-23 PROCEDURE — 74174 CTA ABD&PLVS W/CONTRAST: CPT | Performed by: RADIOLOGY

## 2024-02-23 PROCEDURE — 85730 THROMBOPLASTIN TIME PARTIAL: CPT | Performed by: STUDENT IN AN ORGANIZED HEALTH CARE EDUCATION/TRAINING PROGRAM

## 2024-02-23 PROCEDURE — 99213 OFFICE O/P EST LOW 20 MIN: CPT | Performed by: UROLOGY

## 2024-02-23 PROCEDURE — 84484 ASSAY OF TROPONIN QUANT: CPT | Performed by: STUDENT IN AN ORGANIZED HEALTH CARE EDUCATION/TRAINING PROGRAM

## 2024-02-23 PROCEDURE — 83880 ASSAY OF NATRIURETIC PEPTIDE: CPT | Performed by: STUDENT IN AN ORGANIZED HEALTH CARE EDUCATION/TRAINING PROGRAM

## 2024-02-23 PROCEDURE — 81003 URINALYSIS AUTO W/O SCOPE: CPT | Performed by: UROLOGY

## 2024-02-23 PROCEDURE — 96361 HYDRATE IV INFUSION ADD-ON: CPT

## 2024-02-23 PROCEDURE — 84100 ASSAY OF PHOSPHORUS: CPT | Performed by: STUDENT IN AN ORGANIZED HEALTH CARE EDUCATION/TRAINING PROGRAM

## 2024-02-23 PROCEDURE — 96375 TX/PRO/DX INJ NEW DRUG ADDON: CPT

## 2024-02-23 PROCEDURE — 85025 COMPLETE CBC W/AUTO DIFF WBC: CPT | Performed by: STUDENT IN AN ORGANIZED HEALTH CARE EDUCATION/TRAINING PROGRAM

## 2024-02-23 RX ORDER — MORPHINE SULFATE 4 MG/ML
4 INJECTION, SOLUTION INTRAMUSCULAR; INTRAVENOUS ONCE
Status: COMPLETED | OUTPATIENT
Start: 2024-02-23 | End: 2024-02-24

## 2024-02-23 RX ADMIN — IOHEXOL 100 ML: 350 INJECTION, SOLUTION INTRAVENOUS at 23:26

## 2024-02-23 ASSESSMENT — COLUMBIA-SUICIDE SEVERITY RATING SCALE - C-SSRS
2. HAVE YOU ACTUALLY HAD ANY THOUGHTS OF KILLING YOURSELF?: NO
1. IN THE PAST MONTH, HAVE YOU WISHED YOU WERE DEAD OR WISHED YOU COULD GO TO SLEEP AND NOT WAKE UP?: NO
6. HAVE YOU EVER DONE ANYTHING, STARTED TO DO ANYTHING, OR PREPARED TO DO ANYTHING TO END YOUR LIFE?: NO

## 2024-02-23 ASSESSMENT — PAIN DESCRIPTION - ORIENTATION: ORIENTATION: RIGHT;LOWER

## 2024-02-23 ASSESSMENT — LIFESTYLE VARIABLES
HAVE YOU EVER FELT YOU SHOULD CUT DOWN ON YOUR DRINKING: NO
HAVE PEOPLE ANNOYED YOU BY CRITICIZING YOUR DRINKING: NO
EVER HAD A DRINK FIRST THING IN THE MORNING TO STEADY YOUR NERVES TO GET RID OF A HANGOVER: NO
EVER FELT BAD OR GUILTY ABOUT YOUR DRINKING: NO

## 2024-02-23 ASSESSMENT — PAIN DESCRIPTION - LOCATION: LOCATION: ABDOMEN

## 2024-02-23 ASSESSMENT — PAIN SCALES - GENERAL: PAINLEVEL_OUTOF10: 10 - WORST POSSIBLE PAIN

## 2024-02-23 ASSESSMENT — PAIN - FUNCTIONAL ASSESSMENT: PAIN_FUNCTIONAL_ASSESSMENT: 0-10

## 2024-02-23 ASSESSMENT — PAIN DESCRIPTION - DESCRIPTORS: DESCRIPTORS: SQUEEZING

## 2024-02-23 ASSESSMENT — PAIN DESCRIPTION - PAIN TYPE: TYPE: ACUTE PAIN

## 2024-02-23 ASSESSMENT — PAIN DESCRIPTION - FREQUENCY: FREQUENCY: INTERMITTENT

## 2024-02-23 NOTE — PROGRESS NOTES
02/23/2024  Voiding well, nocturia 1-2    Patient has no nausea, no vomiting, no fever.    ALCON: Deferred    PSA: Pending    We discussed benign prostate hypertrophy with mild voiding symptom  We discussed the elevated PSA,  We discussed nonobstructive kidney stone  We discussed the renal cysts  All the questions were answered, the patient expressed understanding and agreed to the plan.    Impression  BPH  Elevated PSA  Kidney stone  Renal cyst    Plan  Check PSA now and in a year  Conservative management for nonobstructive kidney stones and renal cysts  Appointment in 1 year    Patient last seen by Dr. Campos  Chief Complaint   Patient presents with    Nephrolithiasis     Denies kidney stone symptoms.     Benign Prostatic Hypertrophy     Patient here for year check. Nocturia 2-3x. Urinary frequency 1x every 2 hours. Weak urinary stream. He is not on Alpha blocker.     Elevated PSA     No recent PSA. Patient has lost 20lbs over the couple years. He is trying to eat more but cannot gain weight. Denies bone pain.          Physical Exam     TODAYS LAB RESULTS:    PVR 33 mL     === 10/19/22 ===    CT Urography w 3D Volume Rendered Imaging    - Impression -  IMPRESSION:  1.  3 mm right inferior pole nonobstructing calculus. No  hydronephrosis.  2. Right simple renal cysts.  3. Moderate circumferential urinary bladder wall thickening  concerning for cystitis.  4. Heterogeneous markedly enlarged prostate gland.  5. 2.7 cm infrarenal aortic ectasia.      No recent PSA - there is an order from Dr. Hightower in  PSA 01/11/2021  6.80    POC Glucose, Urine  NEGATIVE mg/dl NEGATIVE   POC Bilirubin, Urine  NEGATIVE NEGATIVE   POC Ketones, Urine  NEGATIVE mg/dl NEGATIVE   POC Specific Gravity, Urine  1.005 - 1.035 1.025   POC Blood, Urine  NEGATIVE MODERATE (2+) Abnormal    POC PH, Urine  No Reference Range Established PH 6.5   POC Protein, Urine  NEGATIVE, 30 (1+) mg/dl 30 (1+)   POC Urobilinogen, Urine  0.2, 1.0 EU/DL 0.2   Poc  Nitrite, Urine  NEGATIVE NEGATIVE   POC Leukocytes, Urine  NEGATIVE TRACE Abnormal      ASSESSMENT&PLAN:      IMPRESSIONS:    7/19/23 Dr. Campos   pt left without being seen - will reschedule         12/13/22  1. History of gross hematuria  Negative CT urogram and cystoscopy 10/2022  No interval symptoms     2. Abdominal pain  LLQ pain   Doing a lot of pushups- does not exacerbate   No bowel symptoms or constipation      3. BPH  On tamsulosin 0.4 mg qHS - needs refill today   Decided to hold off on finasteride      4. Kidney stone  3 mm right lower pole non-obstructing stone        10/26/22  1. Gross hematuria  Here for cystoscopy  s/p CT urogram 10/19/22  Images and report reviewed. Bladder wall thickening; small non-obstructing stone      2. Kidney stone  3 mm right lower pole non-obstructing stone     3. Right simple renal cysts  No follow up required     4. BPH without obstruction  On tamsulosin 0.4 mg qHS   Had Davidson catheter inserted by JY[facility] for retention 10/6/22  Passed void trial during recent hospital admission for chest pain  feels that he is voiding well   9/28/22  NEW PATIENT  1. Gross hematuria  Seen 9/25/22 [place] ED for finger injury  Incidentally mentioned several days of gross hematuria  UA from ED - large blood, many RBC, no pyuria or evidence of UTI  Onset: 1 week ago  Duration: intermittent for the past week.  Timing: intermittent   Voiding symptoms: denies any frequency, urgency, dysuria.  Flank pain? history of low back - denies any recent changes  Fevers/chills? None  Anticoagulation status: Takes Aspirin 81 mg 3 times per week - due to h/o heart attack   History:   History of UTI? 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 Complaining bladder spasm      Patient went to ER last night because he was not able to urinate. They out a catheter in and patient now has complaints of pain and blood in his urine. He said that something does not feel right with the catheter.      Exam: Davidson catheter in place, urine clear yellow, manually irrigated without problem     CT abdomen and pelvis 10/5/2022  IMPRESSION:  Davidson catheter in decompressed bladder. Ill-defined bladder wall  thickening suggests element of cystitis. Clinical correlation  recommended. Markedly enlarged prostate gland likely indenting the  bladder.     Small nonobstructive right kidney stone.     Mild distal colon diverticulosis.     3 cm distal abdominal aortic aneurysm.     There is a small cystic lesion noted within pancreatic tail measuring  1.1 cm. Attention on follow-up imaging (contrast enhanced MRI or  pancreas protocol CT) every two years upto 10 years is recommended to  ensure stability. In event of interval growth, more frequent  follow-up imaging versus Endoscopic ultrasound/fine needle aspiration  may be performed, based on size and rate of growth of lesion.  (Reggie AJ, Rodrigo ME, Jc DE, et al. Management of Incidental  Pancreatic Cysts: A White Paper of the ACR Incidental Findings  Committee. J Am Guilherme Radiol. 2017;14(7):911-923.) PANCREAS.ACR.[email or web address]     There is a right renal lesion, measuring3.6 cm, demonstrating  homogeneous attenuation and thin wall with no internal  septation/calcification or mural nodule. Although, not fully  characterized, the internal low attenuation of -10 to 20 HU, favors  underlying benign etiology/simple cyst.  (Soham BR, Bijal SG, Riley NM, et al. Management of the  Incidental Renal Mass on CT: A White Paper of the ACR Incidental  Findings Committee. J Am Guilherme Radiol. 2018;15(2):264-273.)  [email or web address].[email or web address].3     Other findings as described above.     We discussed the gross hematuria, pending cystoscopy  We discussed benign prostate hypertrophy,  urinary retention, continue Davidson catheter  We discussed Flomax, Pyridium and a Norco  All the questions were answered, the patient expressed understanding and agreed to the plan.     Impression  Urinary retention, PVR more than 2000 cc  BPH  Gross hematuria  Bladder spasm     Plan  Continue Davidson catheter  Pending cystoscopy  Pyridium 200 mg 3 times a day for 7 days  Flomax 0.4 mg daily x30 days  Norco x20  Await cystoscopy     9/28/22  NEW PATIENT  1. Gross hematuria  Seen 9/25/22 [place] ED for finger injury  Incidentally mentioned several days of gross hematuria  UA from ED - large blood, many RBC, no pyuria or evidence of UTI  Onset: 1 week ago  Duration: intermittent for the past week.  Timing: intermittent   Voiding symptoms: denies any frequency, urgency, dysuria.  Flank pain? history of low back - denies any recent changes  Fevers/chills? None  Anticoagulation status: Takes Aspirin 81 mg 3 times per week - due to h/o heart attack   History:   History of UTI? 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     Impression  Elevated PSA negative biopsy  BPH  Dysuria  nocturia      Plan:  Watch voiding  Yearly ALCON and PSA           04/23/2021  Patient was positioned left side down decubitus position.      10 cc 1% lidocaine was injected locally under ultrasound guidance.     Prostate ultrasound was performed and volume was measured.   then prostate needle biopsy was performed and following specimen was  obtained:  Left lateral apex, left lateral mid, left lateral base, left apex, left mid, left base;  Right apex, right mid, right base, right lateral apex, right lateral mid, right lateral base     Patient tolerated the procedure well and with minimal bleeding.     Pain level  0/10 before  2/10 after     We discussed the post biopsy instructions  All the questions were answered, the patient expressed understanding and agreed to the plan.     Impression  Elevated PSA  BPH  Dysuria  nocturia      Plan:  Await pathology  Appointment in 2 weeks        03/08/21   70-year-old gentleman presents and elevated PSA 6.8, also complained some voiding symptoms, nocturia 2-3     Patient has no nausea, no vomiting, no fever.     ALCON: Deferred     PSA 1/17/21, 6.80     Patient is here for an elevated PSA .     We talked about the meaning of PSA elevation, possible prostate cancer, we discussed transrectal ultrasound-guided prostate biopsy. The risk and the benefits were discussed. Patient agreed to proceed a biopsy.     Impression  Elevated PSA  BPH  Dysuria  nocturia      Plan:  Transrectal ultrasound-guided prostate biopsy;  Cipro 500 mg twice a day Ã--3 days started day before biopsy;  Fleets enema used 2 hours before biopsy;  Return to office 2 weeks after biopsy        PSA  1/17/21, 6.80        Surgery  4/23/2021 prostate biopsy negative

## 2024-02-24 ENCOUNTER — APPOINTMENT (OUTPATIENT)
Dept: CARDIOLOGY | Facility: HOSPITAL | Age: 74
End: 2024-02-24
Payer: MEDICARE

## 2024-02-24 VITALS
OXYGEN SATURATION: 99 % | TEMPERATURE: 98.6 F | DIASTOLIC BLOOD PRESSURE: 76 MMHG | HEART RATE: 57 BPM | BODY MASS INDEX: 22.19 KG/M2 | HEIGHT: 70 IN | SYSTOLIC BLOOD PRESSURE: 125 MMHG | WEIGHT: 155 LBS | RESPIRATION RATE: 18 BRPM

## 2024-02-24 LAB
ABO GROUP (TYPE) IN BLOOD: NORMAL
ANTIBODY SCREEN: NORMAL
APPEARANCE UR: CLEAR
BILIRUB UR STRIP.AUTO-MCNC: NEGATIVE MG/DL
COLOR UR: NORMAL
GLUCOSE UR STRIP.AUTO-MCNC: NEGATIVE MG/DL
KETONES UR STRIP.AUTO-MCNC: NEGATIVE MG/DL
LACTATE SERPL-SCNC: 1.3 MMOL/L (ref 0.4–2)
LEUKOCYTE ESTERASE UR QL STRIP.AUTO: NEGATIVE
NITRITE UR QL STRIP.AUTO: NEGATIVE
PH UR STRIP.AUTO: 7 [PH]
PHOSPHATE SERPL-MCNC: 2.8 MG/DL (ref 2.5–4.9)
PROT UR STRIP.AUTO-MCNC: NEGATIVE MG/DL
RBC # UR STRIP.AUTO: NEGATIVE /UL
RH FACTOR (ANTIGEN D): NORMAL
SP GR UR STRIP.AUTO: 1
UROBILINOGEN UR STRIP.AUTO-MCNC: <2 MG/DL

## 2024-02-24 PROCEDURE — 81003 URINALYSIS AUTO W/O SCOPE: CPT | Performed by: STUDENT IN AN ORGANIZED HEALTH CARE EDUCATION/TRAINING PROGRAM

## 2024-02-24 PROCEDURE — 93005 ELECTROCARDIOGRAM TRACING: CPT

## 2024-02-24 PROCEDURE — 83605 ASSAY OF LACTIC ACID: CPT | Performed by: STUDENT IN AN ORGANIZED HEALTH CARE EDUCATION/TRAINING PROGRAM

## 2024-02-24 PROCEDURE — 36415 COLL VENOUS BLD VENIPUNCTURE: CPT | Performed by: STUDENT IN AN ORGANIZED HEALTH CARE EDUCATION/TRAINING PROGRAM

## 2024-02-24 PROCEDURE — 2500000004 HC RX 250 GENERAL PHARMACY W/ HCPCS (ALT 636 FOR OP/ED): Performed by: STUDENT IN AN ORGANIZED HEALTH CARE EDUCATION/TRAINING PROGRAM

## 2024-02-24 RX ORDER — DICYCLOMINE HYDROCHLORIDE 20 MG/1
20 TABLET ORAL 2 TIMES DAILY
Qty: 20 TABLET | Refills: 0 | Status: SHIPPED | OUTPATIENT
Start: 2024-02-24 | End: 2024-03-05

## 2024-02-24 RX ORDER — PANTOPRAZOLE SODIUM 20 MG/1
40 TABLET, DELAYED RELEASE ORAL DAILY
Qty: 20 TABLET | Refills: 0 | Status: SHIPPED | OUTPATIENT
Start: 2024-02-24 | End: 2024-03-04 | Stop reason: SDUPTHER

## 2024-02-24 RX ORDER — KETOROLAC TROMETHAMINE 30 MG/ML
15 INJECTION, SOLUTION INTRAMUSCULAR; INTRAVENOUS ONCE
Status: COMPLETED | OUTPATIENT
Start: 2024-02-24 | End: 2024-02-24

## 2024-02-24 RX ADMIN — SODIUM CHLORIDE, POTASSIUM CHLORIDE, SODIUM LACTATE AND CALCIUM CHLORIDE 1000 ML: 600; 310; 30; 20 INJECTION, SOLUTION INTRAVENOUS at 01:00

## 2024-02-24 RX ADMIN — MORPHINE SULFATE 4 MG: 4 INJECTION, SOLUTION INTRAMUSCULAR; INTRAVENOUS at 00:02

## 2024-02-24 RX ADMIN — KETOROLAC TROMETHAMINE 15 MG: 30 INJECTION, SOLUTION INTRAMUSCULAR at 01:11

## 2024-02-24 ASSESSMENT — PAIN - FUNCTIONAL ASSESSMENT: PAIN_FUNCTIONAL_ASSESSMENT: 0-10

## 2024-02-24 ASSESSMENT — PAIN SCALES - GENERAL: PAINLEVEL_OUTOF10: 3

## 2024-02-24 NOTE — DISCHARGE INSTRUCTIONS
You have multiple incidental findings on your CAT scan report.  I have printed out your CAT scan report and would like you to take this to your primary care physician to have follow-up care regarding these incidental findings.  Additionally you would benefit from having a follow-up appointment with a gastroenterology specialist I have made a referral for you but please call to schedule an appointment as well.

## 2024-02-24 NOTE — PROGRESS NOTES
.  Patient was resting comfortably on reevaluation.  I assumed care of him at change of shift at 7 AM.  Patient was initially sleeping but easy to arouse he states that he was feeling better had some soreness in the right lower quadrant as well as some epigastric discomfort but states he has been belching a lot.  He has been using Ex-Lax for the last 3 days and then had diarrhea bowel movement along with cramping abdominal pain.  He is status post remote appendectomy and I have low suspicion for any acute process as his ED labs and workup here is unremarkable on review of his CT there were multiple incidental findings which I have reviewed with the patient and encouraged him to make of close follow-up appointment with his primary care physician to review the CAT scan results of also report printed out a report of the CAT scan for him to discuss with his primary physician for close follow-up and monitoring and surveillance also made a referral for gastroenterology physician for him as he has a history of a gastric ulcer and is not on any antacid therapy and would benefit from GI as he also had some abnormalities with his biliary tract.  Patient was feeling better after ED treatment prescription for Protonix and Bentyl was sent to his pharmacy he should call his primary doctor first thing on Monday morning for close follow-up appointment or return to the emergency department for any worsening pain development of fever intractable vomiting diarrhea dehydration or any other concern.  He voiced understanding and agreed with the plan of care

## 2024-02-24 NOTE — ED TRIAGE NOTES
Patient requesting medical refill on controlled substance. 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 Told patient that a message will be routed to her provider to make them aware.     No other concerns at this time. All questions answered.     Reason for Disposition  • Caller requesting a CONTROLLED substance prescription refill (e.g., narcotics, ADHD medicines)    Protocols used: MEDICATION REFILL AND RENEWAL CALL-A-AH       Pt presents via ems for abdominal pain. He states that it started while he was having a bowel movement. States that the bowel movement was normal in nature. Denies any red blood or black tarry stools. States that the pain is intermittent and feels like it is squeezing when it comes on. Pain is in the RLQ. Has been going on intermittently for 1 HR. Pt alert and oriented x's 4. Pt states rebound tenderness at times. Pt has had his appendix removed years ago.

## 2024-02-28 NOTE — PROGRESS NOTES
History Of Present Illness  James Zhu is a 73 y.o. male presenting with a chief complaint of No chief complaint on file..      Social History  He reports that he has been smoking cigarettes. He has been smoking an average of 1.5 packs per day. He has never used smokeless tobacco. He reports that he does not drink alcohol. No history on file for drug use.  He {NSAIDS (Optional):01228} on a regular basis    Family History  No family history on file.  The patient {Action; does/does not:75516} have a FH of CRC. he {Action; does/does not:19480} have a FH of IBD    Review of Systems      Physical Exam     Last Vital Signs  There were no vitals taken for this visit.     Relevant Results  Lab Results   Component Value Date    WBC 10.9 02/23/2024    HGB 16.1 02/23/2024    HCT 48.4 02/23/2024    MCV 99 02/23/2024     02/23/2024      Lab Results   Component Value Date    GLUCOSE 96 02/23/2024    CALCIUM 9.9 02/23/2024     02/23/2024    K 3.9 02/23/2024    CO2 25 02/23/2024     02/23/2024    BUN 11 02/23/2024    CREATININE 0.97 02/23/2024      Lab Results   Component Value Date    ALT 21 02/23/2024    AST 21 02/23/2024    ALKPHOS 85 02/23/2024    BILITOT 0.5 02/23/2024    INR 1.0 02/23/2024     CT ANGIO CHEST ABDOMEN PELVIS=== 02/23/24 ===  - Impression -  Chest:  1. No aortic dissection or thoracic aortic aneurysm.  2. Mild distal esophageal wall thickening which may be secondary to  esophagitis.  3. Multi-vessel coronary atherosclerosis.  4. Calcification of the aortic valve leaflets which may predispose to  aortic stenosis.  5. New/increased size of a right apical pulmonary nodule measuring  0.4 cm, image 99/323. 1 year follow-up chest CT is suggested.  6. Additional small pulmonary nodules    Abdomen/pelvis:  1. No aortic dissection. Borderline aneurysmal dilation of the  infrarenal abdominal aorta measuring 3 cm in maximal transverse  diameter, unchanged from 10/19/2022.  2. Borderline biliary ductal  dilation and pancreatic ductal dilation  with subtle soft tissue prominence at the ampulla. The findings could  be secondary to ampullary mass. Endoscopic evaluation is recommended.  Mildly distended distal small bowel containing fluid and ingested  contents which could be secondary to mild enteritis.  3. severe prostatomegaly. Mild circumferential bladder wall  thickening which may be secondary to chronic outflow restriction and  appears overall slightly improved from 10/20/2022. There is somewhat  more focal bladder wall thickening measuring up to 0.8 cm at the  right superolateral bladder wall (axial image 504/621). Correlation  with cystoscopy may be considered.  4. Right renal calculi measuring up to 0.6 cm.    CT abdomen pelvis without IV contrast 10/5/2022-IMPRESSION:  Davidson catheter in decompressed bladder. Ill-defined bladder wall  thickening suggests element of cystitis. Clinical correlation  recommended. Markedly enlarged prostate gland likely indenting the  bladder.     Small nonobstructive right kidney stone.     Mild distal colon diverticulosis.     3 cm distal abdominal aortic aneurysm.     There is a small cystic lesion noted within pancreatic tail measuring  1.1 cm.  EGD ***  Colonoscopy 5/5/2022 with Dr. Pisano- Impression:              - Five small polyp in the ascending colon- TUBULAR ADENOMAS (MULTIPLE FRAGMENTS) AND SESSILE SERRATED ADENOMAS (MULTIPLE FRAGMENTS)          - Eight small polyps in the transverse colon-- TUBULAR ADENOMAS (MULTIPLE FRAGMENTS)        - Two small polyps  in the sigmoid colon- TUBULAR ADENOMA AND HYPERPLASTIC POLYP.     - Three small polyps in the rectum- TUBULAR ADENOMAS (MULTIPLE FRAGMENTS) AND HYPERPLASTIC POLYPS (MULTIPLE FRAGMENTS)         - The examination was otherwise normal on direct and retroflexion views.    Assessment/Plan   73 y.o. male presenting to GI clinic  Had 18 polyps on last colonoscopy in 2022 with >10 adenomas- due for  colonoscopy  MRCP  EGD    {Assess/PlanSmartLinks:99105}    Tammy Reyes, APRN-CNP

## 2024-03-01 ENCOUNTER — APPOINTMENT (OUTPATIENT)
Dept: GASTROENTEROLOGY | Facility: CLINIC | Age: 74
End: 2024-03-01
Payer: MEDICARE

## 2024-03-01 PROBLEM — N40.0 ENLARGED PROSTATE: Status: ACTIVE | Noted: 2024-03-01

## 2024-03-01 PROBLEM — N32.89 BLADDER WALL THICKENING: Status: ACTIVE | Noted: 2024-03-01

## 2024-03-01 PROBLEM — I71.43 INFRARENAL ABDOMINAL AORTIC ANEURYSM (AAA) WITHOUT RUPTURE (CMS-HCC): Status: ACTIVE | Noted: 2024-03-01

## 2024-03-01 PROBLEM — Q45.3 PANCREATIC ABNORMALITY: Status: ACTIVE | Noted: 2024-03-01

## 2024-03-01 NOTE — PROGRESS NOTES
Patient Name:  James Zhu  MRN:  [id number]  :  1950    Subjective   Patient ID: James Zhu is a 73 y.o. male who presents for ER Follow-up (Seen GI recently ).    HPI     74 yo male presents for ED follow up  Seen 24 for cc of abdominal pain   Elevated lactate of 4.4, CBC and CMP unremarkable   he was given morphine and discharged with recommendations to follow up with GI team     IMPRESSION:  Chest:  1. No aortic dissection or thoracic aortic aneurysm.  2. Mild distal esophageal wall thickening which may be secondary to  esophagitis.  3. Multi-vessel coronary atherosclerosis.  4. Calcification of the aortic valve leaflets which may predispose to  aortic stenosis.  5. New/increased size of a right apical pulmonary nodule measuring  0.4 cm, image 99/323. 1 year follow-up chest CT is suggested.  6. Additional small pulmonary nodules      Abdomen/pelvis:  1. No aortic dissection. Borderline aneurysmal dilation of the  infrarenal abdominal aorta measuring 3 cm in maximal transverse  diameter, unchanged from 10/19/2022.  2. Borderline biliary ductal dilation and pancreatic ductal dilation  with subtle soft tissue prominence at the ampulla. The findings could  be secondary to ampullary mass. Endoscopic evaluation is recommended.  Mildly distended distal small bowel containing fluid and ingested  contents which could be secondary to mild enteritis.  3. severe prostatomegaly. Mild circumferential bladder wall  thickening which may be secondary to chronic outflow restriction and  appears overall slightly improved from 10/20/2022. There is somewhat  more focal bladder wall thickening measuring up to 0.8 cm at the  right superolateral bladder wall (axial image 504/621). Correlation  with cystoscopy may be considered.  4. Right renal calculi measuring up to 0.6 cm.    Review of Systems   Constitutional:  Positive for appetite change, fatigue and unexpected weight change. Negative for fever.   Respiratory:   "Negative for shortness of breath.    Cardiovascular:  Negative for chest pain.   Gastrointestinal:  Positive for diarrhea. Negative for abdominal pain.   Neurological:  Negative for facial asymmetry.   Psychiatric/Behavioral:  Negative for confusion.      Objective   /62   Pulse 88   Resp 16   Ht 1.778 m (5' 10\")   Wt 68.5 kg (151 lb)   SpO2 95%   BMI 21.67 kg/m²     Physical Exam  Constitutional:       Appearance: Normal appearance.   HENT:      Head: Normocephalic and atraumatic.   Cardiovascular:      Rate and Rhythm: Normal rate and regular rhythm.   Pulmonary:      Effort: Pulmonary effort is normal.   Neurological:      Mental Status: He is alert and oriented to person, place, and time.   Psychiatric:         Mood and Affect: Mood normal.         Behavior: Behavior normal.     Assessment/Plan   Problem List Items Addressed This Visit             ICD-10-CM    Cigarette nicotine dependence without complication (Chronic) F17.210     Cutting back, still over 1 ppd  Counseled on long term negative health impacts on health if tobacco use continued.  Reviewed options for cessation aid including patches, lozenges, Wellbutrin, Chantix.  Discussed motivational factors to improve chances for success.  Time Spent: 6  Orders: he declines            Pancreatic abnormality - Primary Q45.3     CT scan finding 2024- \"Borderline biliary ductal dilation and pancreatic ductal dilation with subtle soft tissue prominence at the ampulla. The findings could be secondary to ampullary mass. Endoscopic evaluation is recommended.\"  Planning [facility]p at [facility]          Bladder wall thickening N32.89     Prostatomegaly with bladder wall thickening on CT imaging  Adding urology referral for follow up and cystoscopy           Enlarged prostate N40.0     Prostatomegaly with bladder wall thickening on CT imaging  Adding urology referral for follow up and cystoscopy    Used to see Dr. Mac's office, prior elevated PSAs         Infrarenal " abdominal aortic aneurysm (AAA) without rupture (CMS/HCC) (Chronic) I71.43     Patient is on atorvastatin, no hx of BP issues not requiring medication  Stable since 2022, 3cm in diameter    For AAA 3.0 to 3.9 cm, imaging at 3-year intervals   Smoking cessation STRONGLY recommended          Pulmonary nodule, right (Chronic) R91.1     CT 2023- New/increased size of a right apical pulmonary nodule measuring 0.4 cm, image 99/323  Planning 1 year CT follow up  Smoking cessation encouraged          Aortic calcification (CMS/HCC) (Chronic) I70.0     Seen on CT scan  Patient is on Statin medication, BP well controlled not requiring medication at this time

## 2024-03-04 ENCOUNTER — OFFICE VISIT (OUTPATIENT)
Dept: PRIMARY CARE | Facility: CLINIC | Age: 74
End: 2024-03-04
Payer: MEDICARE

## 2024-03-04 ENCOUNTER — OFFICE VISIT (OUTPATIENT)
Dept: GASTROENTEROLOGY | Facility: CLINIC | Age: 74
End: 2024-03-04
Payer: MEDICARE

## 2024-03-04 VITALS
BODY MASS INDEX: 21.62 KG/M2 | DIASTOLIC BLOOD PRESSURE: 81 MMHG | WEIGHT: 151 LBS | HEIGHT: 70 IN | SYSTOLIC BLOOD PRESSURE: 127 MMHG | OXYGEN SATURATION: 95 % | HEART RATE: 64 BPM

## 2024-03-04 VITALS
RESPIRATION RATE: 16 BRPM | BODY MASS INDEX: 21.62 KG/M2 | DIASTOLIC BLOOD PRESSURE: 62 MMHG | HEART RATE: 88 BPM | OXYGEN SATURATION: 95 % | SYSTOLIC BLOOD PRESSURE: 114 MMHG | HEIGHT: 70 IN | WEIGHT: 151 LBS

## 2024-03-04 DIAGNOSIS — K30 ACID INDIGESTION: ICD-10-CM

## 2024-03-04 DIAGNOSIS — N40.0 ENLARGED PROSTATE: ICD-10-CM

## 2024-03-04 DIAGNOSIS — K83.8 AMPULLA OF VATER MASS: Primary | ICD-10-CM

## 2024-03-04 DIAGNOSIS — R91.1 PULMONARY NODULE, RIGHT: ICD-10-CM

## 2024-03-04 DIAGNOSIS — I70.0 AORTIC CALCIFICATION (CMS-HCC): Chronic | ICD-10-CM

## 2024-03-04 DIAGNOSIS — I71.43 INFRARENAL ABDOMINAL AORTIC ANEURYSM (AAA) WITHOUT RUPTURE (CMS-HCC): ICD-10-CM

## 2024-03-04 DIAGNOSIS — N32.89 BLADDER WALL THICKENING: ICD-10-CM

## 2024-03-04 DIAGNOSIS — F17.210 CIGARETTE NICOTINE DEPENDENCE WITHOUT COMPLICATION: Chronic | ICD-10-CM

## 2024-03-04 DIAGNOSIS — Q45.3 PANCREATIC ABNORMALITY: Primary | ICD-10-CM

## 2024-03-04 PROCEDURE — 1125F AMNT PAIN NOTED PAIN PRSNT: CPT | Performed by: STUDENT IN AN ORGANIZED HEALTH CARE EDUCATION/TRAINING PROGRAM

## 2024-03-04 PROCEDURE — 3074F SYST BP LT 130 MM HG: CPT | Performed by: STUDENT IN AN ORGANIZED HEALTH CARE EDUCATION/TRAINING PROGRAM

## 2024-03-04 PROCEDURE — 3074F SYST BP LT 130 MM HG: CPT | Performed by: NURSE PRACTITIONER

## 2024-03-04 PROCEDURE — 1125F AMNT PAIN NOTED PAIN PRSNT: CPT | Performed by: NURSE PRACTITIONER

## 2024-03-04 PROCEDURE — 1157F ADVNC CARE PLAN IN RCRD: CPT | Performed by: NURSE PRACTITIONER

## 2024-03-04 PROCEDURE — 3078F DIAST BP <80 MM HG: CPT | Performed by: STUDENT IN AN ORGANIZED HEALTH CARE EDUCATION/TRAINING PROGRAM

## 2024-03-04 PROCEDURE — 99214 OFFICE O/P EST MOD 30 MIN: CPT | Performed by: STUDENT IN AN ORGANIZED HEALTH CARE EDUCATION/TRAINING PROGRAM

## 2024-03-04 PROCEDURE — 1157F ADVNC CARE PLAN IN RCRD: CPT | Performed by: STUDENT IN AN ORGANIZED HEALTH CARE EDUCATION/TRAINING PROGRAM

## 2024-03-04 PROCEDURE — 99406 BEHAV CHNG SMOKING 3-10 MIN: CPT | Performed by: STUDENT IN AN ORGANIZED HEALTH CARE EDUCATION/TRAINING PROGRAM

## 2024-03-04 PROCEDURE — 1160F RVW MEDS BY RX/DR IN RCRD: CPT | Performed by: NURSE PRACTITIONER

## 2024-03-04 PROCEDURE — 1159F MED LIST DOCD IN RCRD: CPT | Performed by: NURSE PRACTITIONER

## 2024-03-04 PROCEDURE — 1160F RVW MEDS BY RX/DR IN RCRD: CPT | Performed by: STUDENT IN AN ORGANIZED HEALTH CARE EDUCATION/TRAINING PROGRAM

## 2024-03-04 PROCEDURE — 3079F DIAST BP 80-89 MM HG: CPT | Performed by: NURSE PRACTITIONER

## 2024-03-04 PROCEDURE — 1159F MED LIST DOCD IN RCRD: CPT | Performed by: STUDENT IN AN ORGANIZED HEALTH CARE EDUCATION/TRAINING PROGRAM

## 2024-03-04 PROCEDURE — 99215 OFFICE O/P EST HI 40 MIN: CPT | Performed by: NURSE PRACTITIONER

## 2024-03-04 RX ORDER — PANTOPRAZOLE SODIUM 40 MG/1
40 TABLET, DELAYED RELEASE ORAL DAILY
Qty: 30 TABLET | Refills: 0 | Status: SHIPPED | OUTPATIENT
Start: 2024-03-04 | End: 2024-04-03

## 2024-03-04 ASSESSMENT — ENCOUNTER SYMPTOMS
ROS GI COMMENTS: SEE HPI
TROUBLE SWALLOWING: 0
JOINT SWELLING: 0
ABDOMINAL PAIN: 0
OCCASIONAL FEELINGS OF UNSTEADINESS: 0
CONFUSION: 0
DIZZINESS: 0
DIARRHEA: 1
UNEXPECTED WEIGHT CHANGE: 1
APPETITE CHANGE: 1
DEPRESSION: 0
ADENOPATHY: 0
WOUND: 0
FEVER: 0
CHILLS: 0
LOSS OF SENSATION IN FEET: 0
ARTHRALGIAS: 0
WEAKNESS: 1
SHORTNESS OF BREATH: 0
PALPITATIONS: 0
FACIAL ASYMMETRY: 0
BRUISES/BLEEDS EASILY: 0
SORE THROAT: 0
DIFFICULTY URINATING: 0
COUGH: 0
FEVER: 0
FATIGUE: 1
CONFUSION: 0
SHORTNESS OF BREATH: 0
FATIGUE: 1

## 2024-03-04 ASSESSMENT — COLUMBIA-SUICIDE SEVERITY RATING SCALE - C-SSRS
1. IN THE PAST MONTH, HAVE YOU WISHED YOU WERE DEAD OR WISHED YOU COULD GO TO SLEEP AND NOT WAKE UP?: NO
6. HAVE YOU EVER DONE ANYTHING, STARTED TO DO ANYTHING, OR PREPARED TO DO ANYTHING TO END YOUR LIFE?: NO
2. HAVE YOU ACTUALLY HAD ANY THOUGHTS OF KILLING YOURSELF?: NO

## 2024-03-04 NOTE — PATIENT INSTRUCTIONS
Thank you for coming to your appointment today   - I will check with the advanced endoscopists at [facility] to determine if we should send you for a scope (EUS or ERCP) there, or do an MRI here.   - If you are unable to eat or drink, develop yellowing of the eyes or skin, or have severe abdominal pain again, go to ER  - Supplement your diet with Boost or Ensure. Be sure to keep up your caloric intake. If you continue to lose weight, we can think about having you see a dietician   - I will be in touch with your wife     Please call [phone] with any questions or concerns       If you utilize [employer] messages, please understand that these are intended to be used for simple and straightforward medical questions. If you have a more complex question or numerous complaints, an office appointment may be needed.

## 2024-03-04 NOTE — ASSESSMENT & PLAN NOTE
Seen on CT scan  Patient is on Statin medication, BP well controlled not requiring medication at this time

## 2024-03-04 NOTE — ASSESSMENT & PLAN NOTE
Patient is on atorvastatin, no hx of BP issues not requiring medication  Stable since 2022, 3cm in diameter    For AAA 3.0 to 3.9 cm, imaging at 3-year intervals   Smoking cessation STRONGLY recommended

## 2024-03-04 NOTE — ASSESSMENT & PLAN NOTE
Cutting back, still over 1 ppd  Counseled on long term negative health impacts on health if tobacco use continued.  Reviewed options for cessation aid including patches, lozenges, Wellbutrin, Chantix.  Discussed motivational factors to improve chances for success.  Time Spent: 6  Orders: he declines

## 2024-03-04 NOTE — PROGRESS NOTES
Subjective   Patient ID: James Zhu is a 73 y.o. male with PMH of BPH, HTN, HLD, aortic calcification, MI x2, pulmonary nodules, and moderate COPD who was referred by ER for Abdominal Pain (ER follow up ).     Patient's PCP is Flavia Hightower DO       Patient is here with his wife, Vannessa. She helps with his healthcare and they both prefer phone calls go through her when needed.     Patient went to [place] ER on 2/23/2024 for severe RLQ pain and  N/V/D. Lactate was initially elevated at 4.2, but came down to 1.3 after fluids. CBC showed no anemia or leukocytosis. LFTs were normal. CTA chest/abd/pelvis had multiple findings including mild esophageal wall thickening without lymphadenopathy. Increase in size of pulmonary nodules. Increase in borderline dilation of the CBD measuring 9mm, mild dilation of the PD measuring 5mm, and a focal intraluminal soft tissue prominence at the ampulla; There was no focal pancreatic lesion. There was also severe prostatomegaly. He was discharged home with dicyclomine 20mg BID and pantoprazole 40mg daily.     He was not able to eat much for the next week after his discharge from ER. He was drinking water, coffee, and gatorade. He has slowly been getting better and does feel significantly improved from that acute ER visit. He is eating again and ate chicken lionel last night and has had some eggs, flores, and little lashawn snacks today. He reports weight loss over the last few months. He feels he is more fatigued and has weakness. He otherwise denies current dysphagia, N/V, melena, hematochezia, diarrhea, or constipation.       Summary of endoscopies:  - colonoscopy 5/2022: 5 small polyps ascending colon (multiple fragments of SSA and TA), 8 polyps transverse colon (multiple fragments of TA), 2 small polyp sigmoid colon (TA and HP), 2 semi-pedunculated polyps in the rectum (TA and HP)     Social Hx:  Tobacco: 1.5 ppd  Etoh: none  Recreational drug use:  NSAIDs:      Family Hx:  No GI  malignancy, IBD, or pancreatitis     Review of Systems:  Review of Systems   Constitutional:  Positive for fatigue. Negative for chills and fever.   HENT:  Negative for sore throat and trouble swallowing.    Respiratory:  Negative for cough and shortness of breath.    Cardiovascular:  Negative for chest pain and palpitations.   Gastrointestinal:         SEE HPI   Endocrine: Negative for cold intolerance and heat intolerance.   Genitourinary:  Negative for difficulty urinating.   Musculoskeletal:  Negative for arthralgias and joint swelling.   Skin:  Negative for rash and wound.   Neurological:  Positive for weakness. Negative for dizziness.   Hematological:  Negative for adenopathy. Does not bruise/bleed easily.   Psychiatric/Behavioral:  Negative for confusion.         Medications:  Prior to Admission medications    Medication Sig Start Date End Date Taking? Authorizing Provider   albuterol 90 mcg/actuation inhaler Inhale every 6 hours. 8/2/21  Yes Historical Provider, MD   atorvastatin (Lipitor) 20 mg tablet Take 1 tablet (20 mg) by mouth once daily. 2/7/24  Yes Flavia Hightower DO   dicyclomine (Bentyl) 20 mg tablet Take 1 tablet (20 mg) by mouth 2 times a day for 10 days. 2/24/24 3/5/24 Yes Shara Chaves MD   umeclidinium (Incruse Ellipta) 62.5 mcg/actuation inhalation Inhale 1 puff (62.5 mcg) once daily. 2/7/24  Yes Flavia Hightower DO   pantoprazole (ProtoNix) 20 mg EC tablet Take 2 tablets (40 mg) by mouth once daily for 20 days. Do not crush, chew, or split. 2/24/24 3/4/24 Yes Shara Chaves MD   aspirin 81 mg EC tablet Take 1 tablet (81 mg) by mouth once daily.    Historical Provider, MD   meloxicam (Mobic) 15 mg tablet Take 1 tablet (15 mg) by mouth once daily as needed for moderate pain (4 - 6). 10/26/23 10/25/24  Flavia Hightower DO   pantoprazole (ProtoNix) 40 mg EC tablet Take 1 tablet (40 mg) by mouth once daily. Do not crush, chew, or split. 3/4/24 4/3/24  Jodi Flores, APRN-CNP        Allergies:  Patient has no known allergies.    Past Medical History:  He has a past medical history of Personal history of other specified conditions (02/16/2021).    Past Surgical History:  He has a past surgical history that includes Other surgical history (01/11/2021); Other surgical history (01/11/2021); and Other surgical history (01/11/2021).    Social History:  He reports that he has been smoking cigarettes. He has been smoking an average of 1.5 packs per day. He has never used smokeless tobacco. He reports that he does not drink alcohol. No history on file for drug use.    Objective   Physical exam:  Physical Exam  Constitutional:       General: He is not in acute distress.     Appearance: Normal appearance.      Comments: Muscle wasting    HENT:      Mouth/Throat:      Mouth: Mucous membranes are moist.      Comments: pink  Eyes:      General: No scleral icterus.     Conjunctiva/sclera: Conjunctivae normal.      Pupils: Pupils are equal, round, and reactive to light.   Cardiovascular:      Rate and Rhythm: Normal rate and regular rhythm.      Heart sounds: No murmur heard.  Pulmonary:      Effort: Pulmonary effort is normal.      Breath sounds: Normal breath sounds.   Abdominal:      General: Bowel sounds are normal. There is no distension.      Palpations: Abdomen is soft.      Tenderness: There is abdominal tenderness (LUQ, RLQ). There is no guarding.   Skin:     General: Skin is warm and dry.      Coloration: Skin is not jaundiced.   Neurological:      Mental Status: He is alert and oriented to person, place, and time.   Psychiatric:         Mood and Affect: Mood normal.         Behavior: Behavior normal.          RESULTS:  Study Result    Narrative & Impression   Interpreted By:  Albert Bradford,   STUDY:  CT ANGIO CHEST ABDOMEN PELVIS;  2/23/2024 11:34 pm      INDICATION:  Signs/Symptoms:r/o aneurysm      COMPARISON:  12/31/2022      ACCESSION NUMBER(S):  [id number]      ORDERING  CLINICIAN:  RENE ANDRADE      TECHNIQUE:  CT of the chest, abdomen, and pelvis was performed.  Contiguous axial images were obtained through the chest, abdomen and  pelvis.  Coronal and sagittal reconstructions were performed.  Noncontrast images were acquired followed by arterial phase images.  MIP/3D reconstructions were performed on a separate workstation and  provided for interpretation. Intravenous contrast was administered,  100 mL Omnipaque 350..      FINDINGS:      CHEST:      LOWER NECK AND CHEST WALL:  Within normal limits.  MEDIASTINUM/SANTOS:No lymphadenopathy. There is mild distal esophageal  wall thickening. CARDIOVASCULAR:  Cardiac chamber size within normal  limits. No pericardial effusion. Calcification of the aortic valve  leaflets. There is mild aortic atherosclerosis. Aortic caliber is  normal. No aortic dissection. Major aortic arch branch vessels are  patent. Normal caliber of main pulmonary artery. Multi-vessel  coronary atherosclerotic calcification. LUNGS, AIRWAYS, AND PLEURA:  There is severe apical predominant emphysema. No consolidation,  pleural effusion, or pneumothorax. Scattered subpleural  reticular/fibrotic opacities. New/increased size of a right apical  pulmonary nodule measuring 0.4 cm, image 99/323. Unchanged pulmonary  nodules:  3 mm, left upper lobe, image 176.  3 mm subpleural right lower lobe, image 170.  2 mm right posterior apical, image 51.  A previously described 2 mm posterior right apical nodule is not  definitively depicted on the current examination. Additional sub 5 mm  pulmonary nodules are stable.      MUSCULOSKELETAL: No acute osseous abnormality or suspicious osseous  lesions. Moderate multilevel spinal degenerative change.              ABDOMEN:      LIVER: Normal.  BILE DUCTS: There is increasing borderline dilation of the common  bile duct and central intrahepatic ducts measuring up to 0.9 cm.  There is increasing prominence of the main pancreatic duct  measuring  up to 0.5 cm. There is there is focal intraluminal soft tissue  prominence at the ampulla (axial image 352/624). GALLBLADDER: No  calcified stones. No wall thickening. PANCREAS: No focal pancreatic  lesion. Normal pancreatic enhancement. SPLEEN: Within normal limits.  ADRENALS: Within normal limits.  KIDNEYS, URETERS, and BLADDER: Right upper pole renal cyst. No  hydronephrosis. Nonobstructing right renal calculi measuring up to  0.6 cm. No left renal calculi. Ureters are non-dilated. Mild diffuse  urinary bladder wall thickening which appears similar to slightly  less pronounced when compared to 10/19/2022.  there is somewhat more  focal bladder wall thickening measuring up to 0.8 cm at the right  superolateral bladder wall (axial image 504/621). Correlation with  cystoscopy may be considered. REPRODUCTIVE: There is severe  prostatomegaly measuring 6.9 cm transverse with nodular protrusion  into the bladder lumen which appears similar to 10/19/2022. VESSELS:  Moderate atherosclerosis. No abdominal aortic aneurysm. No aortic  dissection. Borderline aneurysmal dilation of the infrarenal  abdominal aorta measuring 3 cm in maximal transverse diameter,  unchanged from 10/19/2022. RETROPERITONEUM and LYMPH NODES: No  lymphadenopathy. BOWEL: The stomach is unremarkable.  Duodenal/ampullary soft tissue prominence as detailed above. The  small bowel is mildly distended containing fluid and ingested  contents. Appendix is not definitively identified. There are  pericecal surgical clips. No pericecal fat stranding or fluid.  Large  bowel is normal. PERITONEUM: No ascites or free air, no fluid  collection. BODY WALL: Small fat containing bilateral inguinal  hernias. MUSCULOSKELETAL: No acute osseous abnormality or suspicious  osseous lesions. Moderate multilevel spinal degenerative change.      IMPRESSION:  Chest:  1. No aortic dissection or thoracic aortic aneurysm.  2. Mild distal esophageal wall thickening which  may be secondary to  esophagitis.  3. Multi-vessel coronary atherosclerosis.  4. Calcification of the aortic valve leaflets which may predispose to  aortic stenosis.  5. New/increased size of a right apical pulmonary nodule measuring  0.4 cm, image 99/323. 1 year follow-up chest CT is suggested.  6. Additional small pulmonary nodules      Abdomen/pelvis:  1. No aortic dissection. Borderline aneurysmal dilation of the  infrarenal abdominal aorta measuring 3 cm in maximal transverse  diameter, unchanged from 10/19/2022.  2. Borderline biliary ductal dilation and pancreatic ductal dilation  with subtle soft tissue prominence at the ampulla. The findings could  be secondary to ampullary mass. Endoscopic evaluation is recommended.  Mildly distended distal small bowel containing fluid and ingested  contents which could be secondary to mild enteritis.  3. severe prostatomegaly. Mild circumferential bladder wall  thickening which may be secondary to chronic outflow restriction and  appears overall slightly improved from 10/20/2022. There is somewhat  more focal bladder wall thickening measuring up to 0.8 cm at the  right superolateral bladder wall (axial image 504/621). Correlation  with cystoscopy may be considered.  4. Right renal calculi measuring up to 0.6 cm.      MACRO:  Critical Finding:  See findings. Notification was initiated on  2/24/2024 at 12:33 am by  Albert Bradford.  (**-YCF-**)  Instructions:      Signed by: Albert Bradford 2/24/2024 12:51 AM  Dictation workstation:   [id number]       Assessment/Plan     Pancreatic/ampullary abnormality   Mild CBD and PD dilation on imaging. Soft tissue prominence at the ampulla concerning for possible mass/malignancy. Pt also reporting weight loss and weakness recently. Will recheck LFTs and lipase. Discussed with advanced endoscopy and recommend patient have EUS at [facility] for further evaluation. Updated wife on plan after speaking with advanced endoscopy. Advised  patient on reasons to return to ER.    Prostatomegaly   Follow up with urology; sees Dr. Banuelos    Pulmonary nodules  Follow up with pulmonology; sees Jemma Vyas CNP    4. Esophageal wall thickening on CT  Continue pantoprazole 40mg daily. Consider endoscopic evaluation in the future.        Jodi Flores, APRN-CNP

## 2024-03-04 NOTE — ASSESSMENT & PLAN NOTE
Prostatomegaly with bladder wall thickening on CT imaging  Adding urology referral for follow up and cystoscopy

## 2024-03-04 NOTE — ASSESSMENT & PLAN NOTE
CT 2023- New/increased size of a right apical pulmonary nodule measuring 0.4 cm, image 99/323  Planning 1 year CT follow up  Smoking cessation encouraged

## 2024-03-04 NOTE — ASSESSMENT & PLAN NOTE
"CT scan finding 2024- \"Borderline biliary ductal dilation and pancreatic ductal dilation with subtle soft tissue prominence at the ampulla. The findings could be secondary to ampullary mass. Endoscopic evaluation is recommended.\"  Planning ERCp at [facility]   "

## 2024-03-04 NOTE — ASSESSMENT & PLAN NOTE
Prostatomegaly with bladder wall thickening on CT imaging  Adding urology referral for follow up and cystoscopy    Used to see Dr. Mac's office, prior elevated PSAs

## 2024-03-10 LAB
ATRIAL RATE: 62 BPM
P AXIS: 57 DEGREES
PR INTERVAL: 161 MS
Q ONSET: 251 MS
QRS COUNT: 10 BEATS
QRS DURATION: 92 MS
QT INTERVAL: 381 MS
QTC CALCULATION(BAZETT): 387 MS
QTC FREDERICIA: 385 MS
R AXIS: 69 DEGREES
T AXIS: 55 DEGREES
T OFFSET: 441 MS
VENTRICULAR RATE: 62 BPM

## 2024-03-26 ENCOUNTER — ANESTHESIA (OUTPATIENT)
Dept: GASTROENTEROLOGY | Facility: HOSPITAL | Age: 74
End: 2024-03-26
Payer: MEDICARE

## 2024-03-26 ENCOUNTER — ANESTHESIA EVENT (OUTPATIENT)
Dept: GASTROENTEROLOGY | Facility: HOSPITAL | Age: 74
End: 2024-03-26
Payer: MEDICARE

## 2024-03-26 ENCOUNTER — HOSPITAL ENCOUNTER (OUTPATIENT)
Dept: GASTROENTEROLOGY | Facility: HOSPITAL | Age: 74
Setting detail: OUTPATIENT SURGERY
Discharge: HOME | End: 2024-03-26
Payer: MEDICARE

## 2024-03-26 VITALS
HEIGHT: 69 IN | RESPIRATION RATE: 16 BRPM | BODY MASS INDEX: 21.19 KG/M2 | TEMPERATURE: 97.9 F | OXYGEN SATURATION: 98 % | HEART RATE: 57 BPM | SYSTOLIC BLOOD PRESSURE: 120 MMHG | DIASTOLIC BLOOD PRESSURE: 69 MMHG | WEIGHT: 143.08 LBS

## 2024-03-26 DIAGNOSIS — K83.8 AMPULLA OF VATER MASS: ICD-10-CM

## 2024-03-26 PROCEDURE — 2720000007 HC OR 272 NO HCPCS

## 2024-03-26 PROCEDURE — 88305 TISSUE EXAM BY PATHOLOGIST: CPT | Performed by: PATHOLOGY

## 2024-03-26 PROCEDURE — 2500000004 HC RX 250 GENERAL PHARMACY W/ HCPCS (ALT 636 FOR OP/ED): Performed by: INTERNAL MEDICINE

## 2024-03-26 PROCEDURE — 3700000001 HC GENERAL ANESTHESIA TIME - INITIAL BASE CHARGE

## 2024-03-26 PROCEDURE — A43242 PR EDG INTRMURAL NEEDLE ASPIR/BIOP ALTERED ANATOMY: Performed by: ANESTHESIOLOGY

## 2024-03-26 PROCEDURE — 88172 CYTP DX EVAL FNA 1ST EA SITE: CPT | Performed by: PATHOLOGY

## 2024-03-26 PROCEDURE — 2500000004 HC RX 250 GENERAL PHARMACY W/ HCPCS (ALT 636 FOR OP/ED): Performed by: NURSE ANESTHETIST, CERTIFIED REGISTERED

## 2024-03-26 PROCEDURE — 7100000010 HC PHASE TWO TIME - EACH INCREMENTAL 1 MINUTE

## 2024-03-26 PROCEDURE — 7100000009 HC PHASE TWO TIME - INITIAL BASE CHARGE

## 2024-03-26 PROCEDURE — 43242 EGD US FINE NEEDLE BX/ASPIR: CPT | Performed by: INTERNAL MEDICINE

## 2024-03-26 PROCEDURE — A43242 PR EDG INTRMURAL NEEDLE ASPIR/BIOP ALTERED ANATOMY: Performed by: INTERNAL MEDICINE

## 2024-03-26 PROCEDURE — 2500000005 HC RX 250 GENERAL PHARMACY W/O HCPCS: Performed by: NURSE ANESTHETIST, CERTIFIED REGISTERED

## 2024-03-26 PROCEDURE — 88112 CYTOPATH CELL ENHANCE TECH: CPT | Performed by: PATHOLOGY

## 2024-03-26 PROCEDURE — 3700000002 HC GENERAL ANESTHESIA TIME - EACH INCREMENTAL 1 MINUTE

## 2024-03-26 PROCEDURE — 88173 CYTOPATH EVAL FNA REPORT: CPT | Mod: TC | Performed by: INTERNAL MEDICINE

## 2024-03-26 PROCEDURE — 88173 CYTOPATH EVAL FNA REPORT: CPT | Performed by: PATHOLOGY

## 2024-03-26 PROCEDURE — 43238 EGD US FINE NEEDLE BX/ASPIR: CPT | Performed by: INTERNAL MEDICINE

## 2024-03-26 PROCEDURE — 0753T DGTZ GLS MCRSCP SLD LEVEL IV: CPT | Mod: TC | Performed by: INTERNAL MEDICINE

## 2024-03-26 PROCEDURE — 99100 ANES PT EXTEME AGE<1 YR&>70: CPT | Performed by: ANESTHESIOLOGY

## 2024-03-26 RX ORDER — SODIUM CHLORIDE, SODIUM LACTATE, POTASSIUM CHLORIDE, CALCIUM CHLORIDE 600; 310; 30; 20 MG/100ML; MG/100ML; MG/100ML; MG/100ML
20 INJECTION, SOLUTION INTRAVENOUS CONTINUOUS
Status: DISCONTINUED | OUTPATIENT
Start: 2024-03-26 | End: 2024-03-27 | Stop reason: HOSPADM

## 2024-03-26 RX ORDER — PROPOFOL 10 MG/ML
INJECTION, EMULSION INTRAVENOUS CONTINUOUS PRN
Status: DISCONTINUED | OUTPATIENT
Start: 2024-03-26 | End: 2024-03-26

## 2024-03-26 RX ORDER — LIDOCAINE HYDROCHLORIDE 20 MG/ML
INJECTION, SOLUTION INFILTRATION; PERINEURAL AS NEEDED
Status: DISCONTINUED | OUTPATIENT
Start: 2024-03-26 | End: 2024-03-26

## 2024-03-26 RX ORDER — PROPOFOL 10 MG/ML
INJECTION, EMULSION INTRAVENOUS AS NEEDED
Status: DISCONTINUED | OUTPATIENT
Start: 2024-03-26 | End: 2024-03-26

## 2024-03-26 RX ADMIN — SODIUM CHLORIDE, POTASSIUM CHLORIDE, SODIUM LACTATE AND CALCIUM CHLORIDE 20 ML/HR: 600; 310; 30; 20 INJECTION, SOLUTION INTRAVENOUS at 13:50

## 2024-03-26 RX ADMIN — PROPOFOL 550 MG: 10 INJECTION, EMULSION INTRAVENOUS at 14:26

## 2024-03-26 RX ADMIN — SODIUM CHLORIDE, POTASSIUM CHLORIDE, SODIUM LACTATE AND CALCIUM CHLORIDE: 600; 310; 30; 20 INJECTION, SOLUTION INTRAVENOUS at 14:15

## 2024-03-26 RX ADMIN — LIDOCAINE HYDROCHLORIDE 50 MG: 20 INJECTION, SOLUTION INFILTRATION; PERINEURAL at 14:25

## 2024-03-26 RX ADMIN — PROPOFOL 50 MG: 10 INJECTION, EMULSION INTRAVENOUS at 14:25

## 2024-03-26 ASSESSMENT — PAIN SCALES - GENERAL
PAINLEVEL_OUTOF10: 0 - NO PAIN

## 2024-03-26 ASSESSMENT — PAIN - FUNCTIONAL ASSESSMENT
PAIN_FUNCTIONAL_ASSESSMENT: 0-10

## 2024-03-26 ASSESSMENT — COLUMBIA-SUICIDE SEVERITY RATING SCALE - C-SSRS
6. HAVE YOU EVER DONE ANYTHING, STARTED TO DO ANYTHING, OR PREPARED TO DO ANYTHING TO END YOUR LIFE?: NO
1. IN THE PAST MONTH, HAVE YOU WISHED YOU WERE DEAD OR WISHED YOU COULD GO TO SLEEP AND NOT WAKE UP?: NO
2. HAVE YOU ACTUALLY HAD ANY THOUGHTS OF KILLING YOURSELF?: NO

## 2024-03-26 NOTE — ANESTHESIA POSTPROCEDURE EVALUATION
Patient: James Zhu    Procedure Summary       Date: 03/26/24 Room / Location: [facility] [place]    Anesthesia Start: 1415 Anesthesia Stop: 1523    Procedure: ENDOSCOPIC ULTRASOUND (UPPER) Diagnosis: Ampulla of Vater mass    Scheduled Providers: Karissa Spear MD; HENNA Anaya-MARE, DNP; Johnny Carter MD Responsible Provider: Johnny Carter MD    Anesthesia Type: MAC ASA Status: 3            Anesthesia Type: MAC    Vitals Value Taken Time   /56 03/26/24 1534   Temp 36.6 °C (97.9 °F) 03/26/24 1519   Pulse 60 03/26/24 1534   Resp 16 03/26/24 1534   SpO2 99 % 03/26/24 1534       Anesthesia Post Evaluation    Patient location during evaluation: bedside  Patient participation: complete - patient participated  Level of consciousness: awake  Pain management: adequate  Multimodal analgesia pain management approach  Airway patency: patent  Cardiovascular status: stable  Respiratory status: spontaneous ventilation and unassisted  Hydration status: acceptable  Postoperative Nausea and Vomiting: none  Comments: No significant PONV.        No notable events documented.

## 2024-03-26 NOTE — H&P
"History Of Present Illness  James Zhu is a 73 y.o. male presenting with abnormal imaging, concern for ampullary lesion.      Referred for EUS.     CTA 2/2024:  Borderline biliary ductal dilation and pancreatic ductal dilation  with subtle soft tissue prominence at the ampulla. The findings could  be secondary to ampullary mass. Endoscopic evaluation is recommended.     Past Medical History  Past Medical History:   Diagnosis Date    AAA (abdominal aortic aneurysm) (CMS/HCC)     BPH (benign prostatic hyperplasia)     CAD (coronary artery disease)     COPD (chronic obstructive pulmonary disease) (CMS/HCC)     Elevated PSA     GERD (gastroesophageal reflux disease)        Surgical History  Past Surgical History:   Procedure Laterality Date    APPENDECTOMY      CARDIAC CATHETERIZATION      FOOT SURGERY          Social History  He reports that he has been smoking cigarettes. He has been smoking an average of 1.5 packs per day. He has never used smokeless tobacco. He reports that he does not drink alcohol and does not use drugs.    Family History  No family history on file.     Allergies  Patient has no known allergies.    Review of Systems     Physical Exam     Last Recorded Vitals  Blood pressure 139/74, pulse 57, temperature 36.4 °C (97.5 °F), temperature source Temporal, resp. rate 18, height 1.753 m (5' 9\"), weight 64.9 kg (143 lb 1.3 oz), SpO2 100 %.    Relevant Results       Assessment/Plan   Proceed with EGD/EUS       I spent 5 minutes in the professional and overall care of this patient.      Karissa Spear MD    " veti  Chief Complaint   Patient presents with   • Dizziness   • Blood Pressure         SUBJECTIVE  HPI:Jan Olivier is a 49 year old male who presents today with mild vertiginous symptoms with slight nausea as well as a frontal occipital headache has been bothering the last couple days.  He is worried its blood pressure but is blood pressure is fine and he was reassured.  Vertiginous symptoms worsen with change in position.    His past medical history is significant for:  Past Medical History:   Diagnosis Date   • Chronic cough    • Deviated septum    • Dyslipidemia    • Fatty liver    • IFG (impaired fasting glucose)    • Nasal mass 12/2019    right   • Obesity        ALLERGIES:  No Known Allergies    Jan Olivier had no medications administered during this visit.      Review of Systems    All other systems reviewed are negative    OBJECTIVE  Vitals:    10/19/21 1605   BP: 128/78   Pulse: 94   Resp: (!) 22   Temp: 98.4 °F (36.9 °C)       Physical Exam:  Patient is alert oriented ×3 and well hydrated.  There are no acute distress.    HEENT-head normocephalic atraumatic.  Pupils equally round react reactive to light and accommodation, eyegrounds benign.  TMs are clear as are the EACs.  Neck supple with no thyromegaly and no appreciable abnormal adenopathy.  No masses.  Oropharyngeal evaluation unremarkable.    HEART-regular rate rhythm no S3-S4, no clicks or rubs    LUNGS-clear to auscultation and percussion, bilateral symmetrical breath sounds outfield.  No fremitus.    ABD-normal bowel sounds throughout all 4 quadrants.  Nontender with no organomegaly, no masses.    EXT-no clubbing cyanosis or edema, pulses 2/4 bilaterally symmetrical.    MS-no significant muscular abnormality or signs of injury.  No significant bony abnormality of acute nature.    SKIN-no rash, no petechiae, no significant ecchymoses, no eruptions    NEURO-patient is alert and oriented ×3.  Cranial nerves II through XII intact.  Sensory  cerebellar exam unremarkable.  Motor 5/5 motor strength upper and lower extremities.  DTRs 2/4 bilateral symmetric upper and lower extremity, toes downgoing.        ASSESSMENT/PLAN  Mild benign positional paroxysmal vertigo  Frontal occipital tension cephalgia    Prednisone 50 mg daily up to 5 days or until the headache aborts.  Atarax 25 mg 3 times daily as needed.  Side effects of both medications explained.  Follow-up next couple days if not improved.  We communicated with the patient through an  at his request.  If symptoms should suddenly change or worsen, seek immediate reevaluation with PCP or return to Immediate Care.  The patient verbalized understanding.    Eleazar Bella DO

## 2024-03-26 NOTE — PERIOPERATIVE NURSING NOTE
1519: Phase 2 care begins  1558: Dr. Spear bedside speaking to pt and family  1600: Discharge instructions reviewed with pt and family  1605: Iv removed  1611: Pt transported to [facility]

## 2024-03-26 NOTE — ANESTHESIA PREPROCEDURE EVALUATION
Patient: James Zhu    Procedure Information       Date/Time: 03/26/24 1400    Scheduled providers: Karissa Spear MD; LACI Anaya, FORTUNATO; Johnny Carter MD    Procedure: ENDOSCOPIC ULTRASOUND (UPPER)    Location: [facility]            Relevant Problems   Cardiovascular   (+) Hypertension   (+) Infrarenal abdominal aortic aneurysm (AAA) without rupture (CMS/HCC)      Pulmonary   (+) COPD, moderate (CMS/HCC)      Eyes, Ears, Nose, and Throat   (+) Sensorineural hearing loss, asymmetrical       Clinical information reviewed:   Tobacco  Allergies  Meds   Med Hx  Surg Hx   Fam Hx  Soc Hx         Past Medical History:   Diagnosis Date    AAA (abdominal aortic aneurysm) (CMS/HCC)     BPH (benign prostatic hyperplasia)     CAD (coronary artery disease)     COPD (chronic obstructive pulmonary disease) (CMS/HCC)     Elevated PSA     GERD (gastroesophageal reflux disease)       Past Surgical History:   Procedure Laterality Date    APPENDECTOMY      CARDIAC CATHETERIZATION      FOOT SURGERY       Social History     Tobacco Use    Smoking status: Every Day     Packs/day: 1.5     Types: Cigarettes    Smokeless tobacco: Never   Vaping Use    Vaping Use: Never used   Substance Use Topics    Alcohol use: Never    Drug use: Never      Current Outpatient Medications   Medication Instructions    albuterol 90 mcg/actuation inhaler inhalation, Every 6 hours    aspirin 81 mg EC tablet 1 tablet, oral, Daily    atorvastatin (LIPITOR) 20 mg, oral, Daily    Incruse Ellipta 62.5 mcg, inhalation, Daily    meloxicam (MOBIC) 15 mg, oral, Daily PRN    pantoprazole (PROTONIX) 40 mg, oral, Daily, Do not crush, chew, or split.      No Known Allergies     Chemistry    Lab Results   Component Value Date/Time     02/23/2024 2311    K 3.9 02/23/2024 2311     02/23/2024 2311    CO2 25 02/23/2024 2311    BUN 11 02/23/2024 2311    CREATININE 0.97 02/23/2024 2311    Lab Results   Component Value Date/Time    CALCIUM  "9.9 02/23/2024 2311    ALKPHOS 85 02/23/2024 2311    AST 21 02/23/2024 2311    ALT 21 02/23/2024 2311    BILITOT 0.5 02/23/2024 2311          Lab Results   Component Value Date    HGBA1C 5.6 11/30/2022     Lab Results   Component Value Date/Time    WBC 10.9 02/23/2024 2311    HGB 16.1 02/23/2024 2311    HCT 48.4 02/23/2024 2311     02/23/2024 2311     Lab Results   Component Value Date/Time    PROTIME 11.5 02/23/2024 2311    INR 1.0 02/23/2024 2311     No results found for: \"ABORH\"  Encounter Date: 02/23/24   ECG 12 lead   Result Value    Ventricular Rate 62    Atrial Rate 62    PA Interval 161    QRS Duration 92    QT Interval 381    QTC Calculation(Bazett) 387    P Axis 57    R Axis 69    T Axis 55    QRS Count 10    Q Onset 251    T Offset 441    QTC Fredericia 385    Narrative    Sinus rhythm    See ED provider note for full interpretation and clinical correlation  Confirmed by Poonam Rahman ([id number]) on 3/10/2024 3:01:18 PM     No results found for this or any previous visit from the past 1095 days.   Cath 10/2/2014@[facility]:  Today's coronary angiography reveals:   LMT: short, no obstructive disease   LAD: Mild diffuse disease. There is a medium caliber diagonal branch with mild   (20-30%) proximal disease   LCX: patent stent in the proximal vessel with mild ISR, otherwise mild disease   RCA: large, dominant vessel, proximal calcification with mild diffuse disease.     Visit Vitals  /74   Pulse 57   Temp 36.4 °C (97.5 °F) (Temporal)   Resp 18   Ht 1.753 m (5' 9\")   Wt 64.9 kg (143 lb 1.3 oz)   SpO2 100%   BMI 21.13 kg/m²   Smoking Status Every Day   BSA 1.78 m²     NPO/Void Status  Carbohydrate Drink Given Prior to Surgery? : N  Date of Last Liquid: 03/26/24  Time of Last Liquid: 0000  Date of Last Solid: 03/25/24  Time of Last Solid: 1900  Last Intake Type: Clear fluids  Time of Last Void: 1343         Physical Exam    Airway  Mallampati: III  TM distance: >3 FB  Neck ROM: full     Cardiovascular "   Rhythm: regular  Rate: normal     Dental - normal exam  (+) upper dentures     Pulmonary   Breath sounds clear to auscultation     Abdominal - normal exam              Anesthesia Plan    History of general anesthesia?: yes  History of complications of general anesthesia?: no    ASA 3     MAC     intravenous induction   Anesthetic plan and risks discussed with patient.    Plan discussed with CRNA and CAA.

## 2024-03-26 NOTE — DISCHARGE INSTRUCTIONS
Patient Instructions after an endoscopy      The anesthetics, sedatives or narcotics which were given to you today will be acting in your body for the next 24 hours, so you might feel a little sleepy or groggy.  This feeling should slowly wear off. Carefully read and follow the instructions.     You received sedation today:  - Do not drive or operate any machinery or power tools of any kind.   - No alcoholic beverages today, not even beer or wine.  - Do not make any important decisions or sign any legal documents.  - No over the counter medications that contain alcohol or that may cause drowsiness.  - Do not make any important decisions or sign any legal documents.    While it is common to experience mild to moderate abdominal distention, gas, or belching after your procedure, if any of these symptoms occur following discharge from the GI Lab or within one week of having your procedure, call the [facility] to be advised whether a visit to your nearest Urgent Care or Emergency Department is indicated.  Take this paper with you if you go.     - If you develop an allergic reaction to the medications that were given during your procedure such as difficulty breathing, rash, hives, severe nausea, vomiting or lightheadedness.- If you experience chest pain, shortness of breath, severe abdominal pain, fevers and chills.    -If you develop signs and symptoms of bleeding such as blood in your spit, if your stools turn black, tarry, or bloody    - If you have not urinated within 8 hours following your procedure.- If your IV site becomes painful, red, inflamed, or looks infected.

## 2024-03-28 LAB
LABORATORY COMMENT REPORT: NORMAL
LABORATORY COMMENT REPORT: NORMAL
PATH REPORT.FINAL DX SPEC: NORMAL
PATH REPORT.GROSS SPEC: NORMAL
PATH REPORT.INTRAOP OBS SPEC DOC: NORMAL
PATH REPORT.TOTAL CANCER: NORMAL

## 2024-03-29 DIAGNOSIS — Z98.890 S/P FOOT SURGERY, RIGHT: ICD-10-CM

## 2024-03-29 RX ORDER — MELOXICAM 15 MG/1
15 TABLET ORAL DAILY PRN
Qty: 90 TABLET | Refills: 0 | Status: SHIPPED | OUTPATIENT
Start: 2024-03-29 | End: 2025-03-29

## 2024-04-03 LAB
LABORATORY COMMENT REPORT: NORMAL
PATH REPORT.FINAL DX SPEC: NORMAL
PATH REPORT.GROSS SPEC: NORMAL
PATH REPORT.TOTAL CANCER: NORMAL

## 2024-04-11 ENCOUNTER — TELEPHONE (OUTPATIENT)
Dept: PRIMARY CARE | Facility: CLINIC | Age: 74
End: 2024-04-11
Payer: MEDICARE

## 2024-04-11 NOTE — TELEPHONE ENCOUNTER
This is what I can see in the chart:   FINAL DIAGNOSIS      A. AMPULLA, BIOPSY:               -Small intestinal mucosa with chronic inflammation.  See note.               -No dysplasia or malignancy identified.     Note: Multiple deeper levels are examined in the evaluation of this specimen.

## 2024-04-11 NOTE — TELEPHONE ENCOUNTER
Patient states that he has been waiting since 3/26/24 about his biopsy A Ampulla Biopsy - ordered by [facility].   He says he has called there multiple times, and has not got a result from them.He he figured his next step was to contact his PCP.   Is there anything we can do to help him get those results?

## 2024-06-07 ENCOUNTER — OFFICE VISIT (OUTPATIENT)
Dept: PULMONOLOGY | Facility: HOSPITAL | Age: 74
End: 2024-06-07
Payer: MEDICARE

## 2024-06-07 VITALS
OXYGEN SATURATION: 100 % | HEIGHT: 69 IN | DIASTOLIC BLOOD PRESSURE: 73 MMHG | TEMPERATURE: 98 F | BODY MASS INDEX: 22.75 KG/M2 | HEART RATE: 55 BPM | RESPIRATION RATE: 16 BRPM | SYSTOLIC BLOOD PRESSURE: 151 MMHG | WEIGHT: 153.6 LBS

## 2024-06-07 DIAGNOSIS — F17.210 CIGARETTE SMOKER: ICD-10-CM

## 2024-06-07 DIAGNOSIS — J44.9 CHRONIC OBSTRUCTIVE PULMONARY DISEASE, UNSPECIFIED COPD TYPE (MULTI): Primary | ICD-10-CM

## 2024-06-07 PROCEDURE — 1159F MED LIST DOCD IN RCRD: CPT | Performed by: NURSE PRACTITIONER

## 2024-06-07 PROCEDURE — 3077F SYST BP >= 140 MM HG: CPT | Performed by: NURSE PRACTITIONER

## 2024-06-07 PROCEDURE — 1160F RVW MEDS BY RX/DR IN RCRD: CPT | Performed by: NURSE PRACTITIONER

## 2024-06-07 PROCEDURE — 3078F DIAST BP <80 MM HG: CPT | Performed by: NURSE PRACTITIONER

## 2024-06-07 PROCEDURE — 99213 OFFICE O/P EST LOW 20 MIN: CPT | Performed by: NURSE PRACTITIONER

## 2024-06-07 PROCEDURE — 4004F PT TOBACCO SCREEN RCVD TLK: CPT | Performed by: NURSE PRACTITIONER

## 2024-06-07 PROCEDURE — 1157F ADVNC CARE PLAN IN RCRD: CPT | Performed by: NURSE PRACTITIONER

## 2024-06-07 RX ORDER — BUDESONIDE AND FORMOTEROL FUMARATE DIHYDRATE 160; 4.5 UG/1; UG/1
2 AEROSOL RESPIRATORY (INHALATION)
COMMUNITY

## 2024-06-07 RX ORDER — UBIDECARENONE 75 MG
500 CAPSULE ORAL DAILY
COMMUNITY

## 2024-06-07 ASSESSMENT — ENCOUNTER SYMPTOMS
FEVER: 0
SHORTNESS OF BREATH: 1
FATIGUE: 0
UNEXPECTED WEIGHT CHANGE: 0
WHEEZING: 0
COUGH: 0
CHILLS: 0
RHINORRHEA: 0

## 2024-06-07 ASSESSMENT — PATIENT HEALTH QUESTIONNAIRE - PHQ9
SUM OF ALL RESPONSES TO PHQ9 QUESTIONS 1 AND 2: 0
2. FEELING DOWN, DEPRESSED OR HOPELESS: NOT AT ALL
1. LITTLE INTEREST OR PLEASURE IN DOING THINGS: NOT AT ALL

## 2024-06-07 NOTE — PATIENT INSTRUCTIONS
How Severe Is Your Rosacea?: moderate I sent a referral to our pharmacy to help with cost of inhalers.   Continue albuterol inhaler as needed.  Please get CT scan of your chest for lung cancer screening in the end of February.   Call with any questions or concerns.   Follow up with me in mid March.    Is This A New Presentation, Or A Follow-Up?: Follow Up Rosacea

## 2024-06-07 NOTE — PROGRESS NOTES
Subjective   Patient ID: James Zhu is a 73 y.o. male who presents for follow up COPD.     HPI: Patient has PMH of COPD and nicotine dependence. He was referred for COPD management. He states that he gets shortness of breath on exertion at times but improves with rest. He is currently taking Trelegy which he does not feel is helping him. He also has albuterol but does not use it often. He denies any sinus drainage and congestion on a daily basis. He states that he has a productive cough and will hear himself wheezing at times. He denies any fever, chills, chest pain, leg swelling, or hemoptysis. He is a current smoker at 1.5 ppd and has smoked for the past 57 years at 1-2 ppd. 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     Today he is here for follow up. He states that his breathing has been a little worse lately. He is currently only taking albuterol prn, he is not on any maintenance inhaler due to cost and he states he does not feel he needs it. He is smoking at 2 ppd. He has no other concerns.    Review of Systems   Constitutional:  Negative for chills, fatigue, fever and unexpected weight change.   HENT:  Negative for congestion, postnasal drip and rhinorrhea.    Respiratory:  Positive for shortness of breath. Negative for cough (denies hemoptysis.) and wheezing.    Cardiovascular:  Negative for chest pain and leg swelling.   All other systems reviewed and are negative.      Objective   Physical Exam  Vitals reviewed.   Constitutional:       Appearance: Normal appearance.   HENT:      Head: Normocephalic.   Cardiovascular:      Rate and Rhythm: Normal rate and regular rhythm.   Pulmonary:      Effort: Pulmonary effort is normal.      Breath sounds: Decreased breath sounds present.   Skin:     General: Skin is warm and dry.   Neurological:      Mental Status: He is alert.         Assessment/Plan   1. COPD/asthma overlap  2. Nicotine dependence      Plan:     -PFTs done in July 2021  with FEV1/FVC 57, FEV1 49-61 significant BDR, . Discussed with patient at length that he has COPD/asthma overlap and that he is in the moderate category. He does not feel that daily inhalers are helping him and is unsure he needs them. He stopped taking maintenance inhalers due to cost. I explained to him the importance of taking these at length. I will send a referral to our pharmacy to see what would be best covered.      -AAT normal, IGE, RAST negative. Eos 240.     -He is a current smoker at 2 ppd he has smoked for the past 57 years at 1-2 ppd. Smoking cessation counseling provided for 5 min, he declines NRT.      -LDCT done on 12/31 with scattered tiny nodules and emphysematous changes. He did not get repeat LDCT, he had CT chest done in February with stable lung nodules. Will order for a repeat LDCT in February 2025.     Overall we will get him on a maintenance inhaler preferably triple therapy. I will get LDCT in February and bring him back with me in March. I instructed patient to call sooner if needed.

## 2024-06-13 ENCOUNTER — TELEMEDICINE (OUTPATIENT)
Dept: PHARMACY | Facility: HOSPITAL | Age: 74
End: 2024-06-13
Payer: MEDICARE

## 2024-06-13 DIAGNOSIS — J44.9 CHRONIC OBSTRUCTIVE PULMONARY DISEASE, UNSPECIFIED COPD TYPE (MULTI): ICD-10-CM

## 2024-06-13 NOTE — PROGRESS NOTES
Pharmacist Clinic: Pulmonary Management    James Zhu is a 73 y.o. male was referred to Clinical Pharmacy Team for Pulmonary assessment.   Referring Provider: Jemma Vyas APRN-C*  Last visit: 6/7/24  Next visit: 3/7/25    Subjective   No Known Allergies    HPI    PULMONARY ASSESSMENT  Patient has been diagnosed with: COPD and Asthma  has had PFT's completed in last 2 years    Current Regimen:  Albuterol HFA PRN    Historical Treatment:  Trelegy (cost/DPI was hard to use)  Symbicort (cost)    Symptom Management:  Current symptoms: dyspnea, cough, and wheezing  Triggers: exertion/hot weather  Alleviating factors: rest    Exacerbation Hx:  When was your last hospitalization for an exacerbation? None recently  When was the last time you were treated with antibiotics and/or steroids? None recently     Rescue Inhaler Use:  How often do you use your rescue inhaler? As needed; some days he doesn't need it    Appropriate Inhaler Technique:  How do you use your rescue inhaler?  No issues  How do you use your maintenance inhaler?  Struggled previously with DPI    Smoking history  - He currently smokes  1.5-2  packs per day.   - History of 1-2 ppd x 57 years  - Declines smoking cessation    Objective   There were no vitals taken for this visit.    Secondary Prevention (vaccines):  -Influenza: Date [n/a]  -PCV13: Date [n/a]  -PPSV23: Date [n/a]  -PCV20: Date [n/a]  -COVID: Date [n/a]  -RSV: Date [n/a]  -TDAP: Date [10/2/17]  -Shingrix: Date [n/a]    Pulmonary Functions Testing Results:        Lab Review  Lab Results   Component Value Date    BILITOT 0.5 02/23/2024    CALCIUM 9.9 02/23/2024    CO2 25 02/23/2024     02/23/2024    CREATININE 0.97 02/23/2024    GLUCOSE 96 02/23/2024    ALKPHOS 85 02/23/2024    K 3.9 02/23/2024    PROT 8.0 02/23/2024     02/23/2024    AST 21 02/23/2024    ALT 21 02/23/2024    BUN 11 02/23/2024    ANIONGAP 15 02/23/2024    MG 2.11 02/23/2024    PHOS 2.8 02/23/2024    ALBUMIN 4.7  02/23/2024    GFRMALE 71 11/30/2022     Hemoglobin   Date Value Ref Range Status   02/23/2024 16.1 13.5 - 17.5 g/dL Final     WBC   Date Value Ref Range Status   02/23/2024 10.9 4.4 - 11.3 x10*3/uL Final     Platelets   Date Value Ref Range Status   02/23/2024 219 150 - 450 x10*3/uL Final       The ASCVD Risk score (Renan DK, et al., 2019) failed to calculate for the following reasons:    The patient has a prior MI or stroke diagnosis    Current Outpatient Medications   Medication Instructions    albuterol 90 mcg/actuation inhaler inhalation, Every 6 hours    aspirin 81 mg EC tablet 1 tablet, oral, Daily    atorvastatin (LIPITOR) 20 mg, oral, Daily    budesonide-glycopyr-formoterol (Breztri Aerosphere) 160-9-4.8 mcg/actuation HFA aerosol inhaler 2 puffs, inhalation, 2 times daily RT    cyanocobalamin (VITAMIN B-12) 500 mcg, oral, Daily    meloxicam (MOBIC) 15 mg, oral, Daily PRN    pantoprazole (PROTONIX) 40 mg, oral, Daily, Do not crush, chew, or split.     Drug Interactions:  None    Affordability/Accessibility:  Patient has had issues in past with inhaler cost; Breztri cov'd $47/month    Assessment/Plan   Problem List Items Addressed This Visit    None  Visit Diagnoses         Codes    Chronic obstructive pulmonary disease, unspecified COPD type (Multi)     J44.9    Relevant Medications    budesonide-glycopyr-formoterol (Breztri Aerosphere) 160-9-4.8 mcg/actuation HFA aerosol inhaler          ASSESSMENT:  Patient with moderate COPD/asthma overlap not currently taking any maintenance inhaler therapy. He doesn't feel they've helped in the past and has struggled with DPI inhalers + cost of inhalers. Upon assessment for  PAP, patient should qualify. Based on history of issues with DPI, will move forward with Josefinaztri.     Patient Assistance Program (PAP)    Patient verbally reports monthly or yearly income which is less than 400% federal poverty level    Application for program to be submitted for the following  medications: Breztri    Prescription Insurance: Yes  Members of Household: 2  Files Taxes: No    Patient will be dropping of financial paperwork at [facility].     Patient aware this process may take up to 6 weeks.     If approved medication must be filled through [facility] pharmacy and mailed to patient.       PLAN:  START Breztri 2 puffs daily.  Patient can use spacer with this. He also should ensure to always rinse mouth and spit after use d/t ICS + risk of thrush.  CONTINUE albuterol HFA PRN  Extensively educated patient on purpose of maintenance inhaler vs rescue inhaler  Recommend patient receive the following vaccinations based on CDC guidelines:  Influenza, COVID, RSV, PSV20, Shingrix  Follow up: patient to bring SSB statements to [facility] to have them send to me; will submit Rx once I receive these    Alley Cabrera, PharmD  Clinical Pharmacy Specialist  [phone]  [email or web address]     Continue all meds under the continuation of care with the referring provider and clinical pharmacy team.    Verbal consent to manage patient's drug therapy was obtained from the patient. They were informed they may decline to participate or withdraw from participation in pharmacy services at any time.

## 2024-06-18 ENCOUNTER — TELEPHONE (OUTPATIENT)
Dept: PHARMACY | Facility: HOSPITAL | Age: 74
End: 2024-06-18
Payer: MEDICARE

## 2024-06-18 ENCOUNTER — SPECIALTY PHARMACY (OUTPATIENT)
Dept: PHARMACY | Facility: CLINIC | Age: 74
End: 2024-06-18

## 2024-06-18 DIAGNOSIS — J44.9 CHRONIC OBSTRUCTIVE PULMONARY DISEASE, UNSPECIFIED COPD TYPE (MULTI): Primary | ICD-10-CM

## 2024-06-18 PROCEDURE — RXMED WILLOW AMBULATORY MEDICATION CHARGE

## 2024-06-18 NOTE — TELEPHONE ENCOUNTER
Patient Assistance Program Approval:     We are pleased to inform you that your application for assistance has been approved.    This approval is valid through  6/18/25  as long as the following criteria continue to be satisfied:     Your medication (Breztri) remains covered under your current insurance plan.   Your prescriber does not discontinue therapy.   You do not seek reimbursement from any other private or government-funded programs for the  medication.    Under this program, the pharmacy will first bill your insurance plan for your specified medication. The [employer] Assistance Fund will then offset your copay balance, so that your out-of pocket expense for your medication will be $0.00.     Medication will be filled through [facility], and mailed to the patient. Contacted on the status of the approval. Provided the [facility] phone number, and made aware that they will be hearing from someone at [facility] to set up delivery for the prescriptions.     Please reach out to the Clinical Pharmacy Team if there are any further questions.     Follow up with Clinical Pharmacy Team - 1 month    Continue all meds under the continuation of care with the referring provider and clinical pharmacy team.    Verbal consent to manage patient's drug therapy was obtained from patient. They were informed they may decline to participate or withdraw from participation in pharmacy services at any time.

## 2024-06-20 ENCOUNTER — PHARMACY VISIT (OUTPATIENT)
Dept: PHARMACY | Facility: CLINIC | Age: 74
End: 2024-06-20
Payer: COMMERCIAL

## 2024-06-22 DIAGNOSIS — Z98.890 S/P FOOT SURGERY, RIGHT: ICD-10-CM

## 2024-06-24 RX ORDER — MELOXICAM 15 MG/1
TABLET ORAL
Qty: 90 TABLET | Refills: 0 | Status: SHIPPED | OUTPATIENT
Start: 2024-06-24

## 2024-07-15 ENCOUNTER — APPOINTMENT (OUTPATIENT)
Dept: PHARMACY | Facility: HOSPITAL | Age: 74
End: 2024-07-15
Payer: MEDICARE

## 2024-07-16 ENCOUNTER — APPOINTMENT (OUTPATIENT)
Dept: PHARMACY | Facility: HOSPITAL | Age: 74
End: 2024-07-16
Payer: MEDICARE

## 2024-07-16 DIAGNOSIS — J44.9 CHRONIC OBSTRUCTIVE PULMONARY DISEASE, UNSPECIFIED COPD TYPE (MULTI): ICD-10-CM

## 2024-07-16 NOTE — PROGRESS NOTES
Pharmacist Clinic: Pulmonary Management    James Zhu is a 73 y.o. male was referred to Clinical Pharmacy Team for Pulmonary assessment.   Referring Provider: Jemma Vyas, LAWSON*  Last visit: 6/7/24  Next visit: 3/7/25    Subjective   No Known Allergies    HPI     Patient Assistance for Breztri approved through 6/18/25. Will have to be renewed prior to that date to prevent lapse in coverage. Medication(s) will be received at no cost to patient from [facility].     PULMONARY ASSESSMENT  Patient has been diagnosed with: COPD and Asthma  has had PFT's completed in last 2 years    Current Regimen:  Breztri 2 puffs twice daily  Albuterol HFA PRN    Historical Treatment:  Trelegy (cost/DPI was hard to use)  Symbicort (cost)    Symptom Management:  Current symptoms: dyspnea, cough, and wheezing  Triggers: exertion/hot weather  Alleviating factors: rest    Exacerbation Hx:  When was your last hospitalization for an exacerbation? None recently  When was the last time you were treated with antibiotics and/or steroids? None recently     Rescue Inhaler Use:  How often do you use your rescue inhaler? As needed; some days he doesn't need it    Appropriate Inhaler Technique:  How do you use your rescue inhaler?  No issues  How do you use your maintenance inhaler?  Struggled previously with DPI    Smoking history  - He currently smokes  1.5-2  packs per day.   - History of 1-2 ppd x 57 years  - Declines smoking cessation    Objective   There were no vitals taken for this visit.    Secondary Prevention (vaccines):  -Influenza: Date [n/a]  -PCV13: Date [n/a]  -PPSV23: Date [n/a]  -PCV20: Date [n/a]  -COVID: Date [n/a]  -RSV: Date [n/a]  -TDAP: Date [10/2/17]  -Shingrix: Date [n/a]    Pulmonary Functions Testing Results:        Lab Review  Lab Results   Component Value Date    BILITOT 0.5 02/23/2024    CALCIUM 9.9 02/23/2024    CO2 25 02/23/2024     02/23/2024    CREATININE 0.97 02/23/2024    GLUCOSE 96  02/23/2024    ALKPHOS 85 02/23/2024    K 3.9 02/23/2024    PROT 8.0 02/23/2024     02/23/2024    AST 21 02/23/2024    ALT 21 02/23/2024    BUN 11 02/23/2024    ANIONGAP 15 02/23/2024    MG 2.11 02/23/2024    PHOS 2.8 02/23/2024    ALBUMIN 4.7 02/23/2024    GFRMALE 71 11/30/2022     Hemoglobin   Date Value Ref Range Status   02/23/2024 16.1 13.5 - 17.5 g/dL Final     WBC   Date Value Ref Range Status   02/23/2024 10.9 4.4 - 11.3 x10*3/uL Final     Platelets   Date Value Ref Range Status   02/23/2024 219 150 - 450 x10*3/uL Final       The ASCVD Risk score (Renan LARSEN, et al., 2019) failed to calculate for the following reasons:    The patient has a prior MI or stroke diagnosis    Current Outpatient Medications   Medication Instructions    albuterol 90 mcg/actuation inhaler inhalation, Every 6 hours    aspirin 81 mg EC tablet 1 tablet, oral, Daily    atorvastatin (LIPITOR) 20 mg, oral, Daily    budesonide-glycopyr-formoterol (Breztri Aerosphere) 160-9-4.8 mcg/actuation HFA aerosol inhaler 2 puffs, inhalation, 2 times daily RT    cyanocobalamin (VITAMIN B-12) 500 mcg, oral, Daily    meloxicam (Mobic) 15 mg tablet TAKE 1 TABLET BY MOUTH ONCE DAILY AS NEEDED FOR MODERATE PAIN (4 - 6)    pantoprazole (PROTONIX) 40 mg, oral, Daily, Do not crush, chew, or split.     Drug Interactions:  None    Affordability/Accessibility:  Patient has had issues in past with inhaler cost; Breztri cov'd $47/month    Assessment/Plan   Problem List Items Addressed This Visit    None  Visit Diagnoses         Codes    Chronic obstructive pulmonary disease, unspecified COPD type (Multi)     J44.9    Relevant Orders    Follow Up In Clinical Pharmacy          ASSESSMENT:  Patient with moderate COPD/asthma overlap that is well controlled on current therapy. He is doing well with Breztri so far and has not had to use albuterol for rescue therapy.       PLAN:  CONTINUE Breztri 2 puffs daily.  Patient can use spacer with this. He also should ensure  to always rinse mouth and spit after use d/t ICS + risk of thrush.  CONTINUE albuterol HFA PRN  Extensively educated patient on purpose of maintenance inhaler vs rescue inhaler  Recommend patient receive the following vaccinations based on CDC guidelines:  Influenza, COVID, RSV, PSV20, Shingrix  Follow up: 10 months for  PAP renewal    Alley Cabrera, PharmD  Clinical Pharmacy Specialist  [phone]  [email or web address]     Continue all meds under the continuation of care with the referring provider and clinical pharmacy team.    Verbal consent to manage patient's drug therapy was obtained from the patient. They were informed they may decline to participate or withdraw from participation in pharmacy services at any time.

## 2024-08-08 ENCOUNTER — APPOINTMENT (OUTPATIENT)
Dept: PRIMARY CARE | Facility: CLINIC | Age: 74
End: 2024-08-08
Payer: MEDICARE

## 2024-09-18 ENCOUNTER — APPOINTMENT (OUTPATIENT)
Dept: PRIMARY CARE | Facility: CLINIC | Age: 74
End: 2024-09-18
Payer: MEDICARE

## 2024-09-18 VITALS
DIASTOLIC BLOOD PRESSURE: 68 MMHG | BODY MASS INDEX: 21.33 KG/M2 | WEIGHT: 149 LBS | OXYGEN SATURATION: 98 % | HEART RATE: 64 BPM | SYSTOLIC BLOOD PRESSURE: 112 MMHG | HEIGHT: 70 IN

## 2024-09-18 DIAGNOSIS — I70.0 AORTIC CALCIFICATION (CMS-HCC): Chronic | ICD-10-CM

## 2024-09-18 DIAGNOSIS — J44.9 COPD, MODERATE (MULTI): Chronic | ICD-10-CM

## 2024-09-18 DIAGNOSIS — Z23 NEED FOR SHINGLES VACCINE: ICD-10-CM

## 2024-09-18 DIAGNOSIS — Z00.00 MEDICARE ANNUAL WELLNESS VISIT, SUBSEQUENT: Primary | ICD-10-CM

## 2024-09-18 DIAGNOSIS — M79.673 CHRONIC FOOT PAIN, UNSPECIFIED LATERALITY: Chronic | ICD-10-CM

## 2024-09-18 DIAGNOSIS — Z98.890 HX OF FOOT SURGERY: ICD-10-CM

## 2024-09-18 DIAGNOSIS — F17.210 CIGARETTE NICOTINE DEPENDENCE WITHOUT COMPLICATION: Chronic | ICD-10-CM

## 2024-09-18 DIAGNOSIS — G89.29 CHRONIC FOOT PAIN, UNSPECIFIED LATERALITY: Chronic | ICD-10-CM

## 2024-09-18 PROBLEM — I10 HYPERTENSION: Status: RESOLVED | Noted: 2023-04-20 | Resolved: 2024-09-18

## 2024-09-18 PROCEDURE — 4004F PT TOBACCO SCREEN RCVD TLK: CPT | Performed by: STUDENT IN AN ORGANIZED HEALTH CARE EDUCATION/TRAINING PROGRAM

## 2024-09-18 PROCEDURE — G0439 PPPS, SUBSEQ VISIT: HCPCS | Performed by: STUDENT IN AN ORGANIZED HEALTH CARE EDUCATION/TRAINING PROGRAM

## 2024-09-18 PROCEDURE — 1160F RVW MEDS BY RX/DR IN RCRD: CPT | Performed by: STUDENT IN AN ORGANIZED HEALTH CARE EDUCATION/TRAINING PROGRAM

## 2024-09-18 PROCEDURE — 1123F ACP DISCUSS/DSCN MKR DOCD: CPT | Performed by: STUDENT IN AN ORGANIZED HEALTH CARE EDUCATION/TRAINING PROGRAM

## 2024-09-18 PROCEDURE — 99397 PER PM REEVAL EST PAT 65+ YR: CPT | Performed by: STUDENT IN AN ORGANIZED HEALTH CARE EDUCATION/TRAINING PROGRAM

## 2024-09-18 PROCEDURE — 1159F MED LIST DOCD IN RCRD: CPT | Performed by: STUDENT IN AN ORGANIZED HEALTH CARE EDUCATION/TRAINING PROGRAM

## 2024-09-18 PROCEDURE — 1158F ADVNC CARE PLAN TLK DOCD: CPT | Performed by: STUDENT IN AN ORGANIZED HEALTH CARE EDUCATION/TRAINING PROGRAM

## 2024-09-18 PROCEDURE — 1157F ADVNC CARE PLAN IN RCRD: CPT | Performed by: STUDENT IN AN ORGANIZED HEALTH CARE EDUCATION/TRAINING PROGRAM

## 2024-09-18 PROCEDURE — 3008F BODY MASS INDEX DOCD: CPT | Performed by: STUDENT IN AN ORGANIZED HEALTH CARE EDUCATION/TRAINING PROGRAM

## 2024-09-18 PROCEDURE — 1170F FXNL STATUS ASSESSED: CPT | Performed by: STUDENT IN AN ORGANIZED HEALTH CARE EDUCATION/TRAINING PROGRAM

## 2024-09-18 PROCEDURE — 99214 OFFICE O/P EST MOD 30 MIN: CPT | Performed by: STUDENT IN AN ORGANIZED HEALTH CARE EDUCATION/TRAINING PROGRAM

## 2024-09-18 ASSESSMENT — ENCOUNTER SYMPTOMS
LOSS OF SENSATION IN FEET: 0
ACTIVITY CHANGE: 0
ARTHRALGIAS: 1
CONFUSION: 0
WEAKNESS: 0
NUMBNESS: 1
OCCASIONAL FEELINGS OF UNSTEADINESS: 0
WHEEZING: 0
SLEEP DISTURBANCE: 0
HEADACHES: 0
LIGHT-HEADEDNESS: 0
DEPRESSION: 1
SHORTNESS OF BREATH: 0
APPETITE CHANGE: 0
NERVOUS/ANXIOUS: 0

## 2024-09-18 ASSESSMENT — PATIENT HEALTH QUESTIONNAIRE - PHQ9
1. LITTLE INTEREST OR PLEASURE IN DOING THINGS: NOT AT ALL
SUM OF ALL RESPONSES TO PHQ9 QUESTIONS 1 AND 2: 0
2. FEELING DOWN, DEPRESSED OR HOPELESS: NOT AT ALL

## 2024-09-18 ASSESSMENT — ACTIVITIES OF DAILY LIVING (ADL)
TAKING_MEDICATION: INDEPENDENT
DOING_HOUSEWORK: INDEPENDENT
GROCERY_SHOPPING: INDEPENDENT
BATHING: INDEPENDENT
MANAGING_FINANCES: INDEPENDENT
DRESSING: INDEPENDENT

## 2024-09-18 NOTE — ASSESSMENT & PLAN NOTE
Moderate degree of 2024 PFTS  Maintanece- Breztri  PRN- albuterol   No recent hospitalizations, steroid use  Not interested in smoking cessation     Orders:    Follow Up In [facility] - PCP - Established

## 2024-09-18 NOTE — ASSESSMENT & PLAN NOTE
Up to 2ppd  Counseled on long term negative health impacts on health if tobacco use continued.  Reviewed options for cessation aid including patches, lozenges, Wellbutrin, Chantix.  Discussed motivational factors to improve chances for success.  Time Spent:3  Orders: he declines, not ready for cessation

## 2024-09-18 NOTE — PROGRESS NOTES
Subjective   Reason for Visit: James Zhu is an 74 y.o. male here for a Medicare Wellness visit.     Past Medical, Surgical, and Family History reviewed and updated in chart.    Reviewed all medications by prescribing practitioner or clinical pharmacist (such as prescriptions, OTCs, herbal therapies and supplements) and documented in the medical record.    HPI    73 yo male presents for follow up and medicare    Reports smoking 2 ppd now   Moving     Wanting shingles vaccine   Hx of having     Patient Care Team:  Flavia Hightower DO as PCP - General (Family Medicine)  Alesia Villa MD as PCP - [employer] PCP  HENNA Mishra-CNP as Nurse Practitioner (Pulmonary Disease)  Oleksandr Banuelos MD as Surgeon (Urology)     Past Medical History:   Diagnosis Date    AAA (abdominal aortic aneurysm) (CMS-[facility])     BPH (benign prostatic hyperplasia)     CAD (coronary artery disease)     COPD (chronic obstructive pulmonary disease) (Multi)     Elevated PSA     GERD (gastroesophageal reflux disease)      Past Surgical History:   Procedure Laterality Date    APPENDECTOMY      CARDIAC CATHETERIZATION      FOOT SURGERY       Family History   Problem Relation Name Age of Onset    COPD Mother      Stroke Mother       Body mass index is 21.38 kg/m².    Tobacco/Alcohol/Opioid use, as well as Illicit Drug Use was screened for/reviewed and documented in Social History section and medication list as appropriate    Medications and Supplements  prescribed by me and other practitioners or clinical pharmacist (such as prescriptions, OTC's, herbal therapies and supplements) were reviewed and documented in the medical record.    Tobacco/Alcohol/Opioid use, as well as Illicit Drug Use was screened for/reviewed and documented in Social History section and medication list as appropriate    Review of Systems   Constitutional:  Negative for activity change and appetite change.   Eyes:  Negative for visual disturbance.  "  Respiratory:  Negative for shortness of breath and wheezing.    Cardiovascular:  Negative for chest pain and leg swelling.   Musculoskeletal:  Positive for arthralgias.   Neurological:  Positive for numbness. Negative for weakness, light-headedness and headaches.   Psychiatric/Behavioral:  Negative for confusion, sleep disturbance and suicidal ideas. The patient is not nervous/anxious.         Stress     Objective   Vitals:  /68 (BP Location: Left arm, Patient Position: Sitting)   Pulse 64   Ht 1.778 m (5' 10\")   Wt 67.6 kg (149 lb)   SpO2 98%   BMI 21.38 kg/m²       Physical Exam  Constitutional:       General: He is not in acute distress.     Appearance: Normal appearance. He is not ill-appearing.   HENT:      Head: Normocephalic and atraumatic.      Right Ear: Hearing normal.      Left Ear: Hearing normal.      Mouth/Throat:      Pharynx: No oropharyngeal exudate or posterior oropharyngeal erythema.   Eyes:      Extraocular Movements: Extraocular movements intact.   Cardiovascular:      Rate and Rhythm: Normal rate and regular rhythm.   Pulmonary:      Effort: Pulmonary effort is normal. No respiratory distress.      Breath sounds: Wheezing present.      Comments: Expiratory wheezing   Abdominal:      Tenderness: There is no abdominal tenderness.   Musculoskeletal:      Cervical back: No tenderness.      Right lower leg: No edema.      Left lower leg: No edema.   Skin:     General: Skin is warm and dry.   Neurological:      General: No focal deficit present.      Mental Status: He is alert and oriented to person, place, and time.   Psychiatric:         Mood and Affect: Mood normal.         Behavior: Behavior normal.       Assessment & Plan  Medicare annual wellness visit, subsequent  PNEUMONIA vaccine- Declined  SHINGLES vaccine- Sent to pharmacy due to insurance   INFLUENZA vaccine-Declined    Screening tests:   Lung Cancer screening--> ordered but not completed by patient  Colon cancer screening--> " Completed 2022, not yet due  Prostate Cancer Screening--> Pending from Dr. Mac has been completed yet    During the course of the visit the patient was educated and counseled about age appropriate screening and preventive services. Completed preventive screenings were documented in the chart and orders were placed for outstanding screenings/procedures as documented in the Assessment and Plan.  Patient Instructions (the written plan) was given to the patient at check out.    Orders:    Follow Up In [facility] - PCP - Established; Future    COPD, moderate (Multi)  Moderate degree of 2024 PFTS  Maintanece- Breztri  PRN- albuterol   No recent hospitalizations, steroid use  Not interested in smoking cessation     Orders:    Follow Up In [facility] - PCP - Established    Need for shingles vaccine  Orders:    zoster vaccine-recombinant adjuvanted (Shingrix) 50 mcg/0.5 mL vaccine; Inject 0.5 mL into the muscle 1 time for 1 dose.    Aortic calcification (CMS-HCC)  Seen on CT scan  Patient is on Statin medication, BP well controlled not requiring medication at this time          Hx of foot surgery  Thinking about podiatry referral will let me know        Cigarette nicotine dependence without complication  Up to 2ppd  Counseled on long term negative health impacts on health if tobacco use continued.  Reviewed options for cessation aid including patches, lozenges, Wellbutrin, Chantix.  Discussed motivational factors to improve chances for success.  Time Spent:3  Orders: he declines, not ready for cessation         Chronic foot pain, unspecified laterality  Possible neuropathy  Pending EMG

## 2024-09-18 NOTE — ASSESSMENT & PLAN NOTE
PNEUMONIA vaccine- Declined  SHINGLES vaccine- Sent to pharmacy due to insurance   INFLUENZA vaccine-Declined    Screening tests:   Lung Cancer screening--> ordered but not completed by patient  Colon cancer screening--> Completed 2022, not yet due  Prostate Cancer Screening--> Pending from Dr. Mac has been completed yet    During the course of the visit the patient was educated and counseled about age appropriate screening and preventive services. Completed preventive screenings were documented in the chart and orders were placed for outstanding screenings/procedures as documented in the Assessment and Plan.  Patient Instructions (the written plan) was given to the patient at check out.    Orders:    Follow Up In [facility] - PCP - Established; Future

## 2024-11-06 DIAGNOSIS — J44.9 CHRONIC OBSTRUCTIVE PULMONARY DISEASE, UNSPECIFIED COPD TYPE (MULTI): ICD-10-CM

## 2024-11-06 RX ORDER — BUDESONIDE, GLYCOPYRROLATE, AND FORMOTEROL FUMARATE 160; 9; 4.8 UG/1; UG/1; UG/1
2 AEROSOL, METERED RESPIRATORY (INHALATION)
Qty: 32.1 G | Refills: 3 | Status: SHIPPED | OUTPATIENT
Start: 2024-11-06

## 2024-11-15 PROCEDURE — RXMED WILLOW AMBULATORY MEDICATION CHARGE

## 2024-11-19 ENCOUNTER — PHARMACY VISIT (OUTPATIENT)
Dept: PHARMACY | Facility: CLINIC | Age: 74
End: 2024-11-19
Payer: COMMERCIAL

## 2024-11-19 DIAGNOSIS — E78.5 DYSLIPIDEMIA: ICD-10-CM

## 2024-11-20 RX ORDER — ATORVASTATIN CALCIUM 20 MG/1
20 TABLET, FILM COATED ORAL DAILY
Qty: 100 TABLET | Refills: 2 | Status: SHIPPED | OUTPATIENT
Start: 2024-11-20

## 2024-12-02 ENCOUNTER — APPOINTMENT (OUTPATIENT)
Dept: PRIMARY CARE | Facility: CLINIC | Age: 74
End: 2024-12-02
Payer: MEDICARE

## 2024-12-02 ENCOUNTER — TELEPHONE (OUTPATIENT)
Dept: PRIMARY CARE | Facility: CLINIC | Age: 74
End: 2024-12-02

## 2024-12-02 ENCOUNTER — HOSPITAL ENCOUNTER (OUTPATIENT)
Dept: RADIOLOGY | Facility: HOSPITAL | Age: 74
Discharge: HOME | End: 2024-12-02
Payer: MEDICARE

## 2024-12-02 VITALS
WEIGHT: 150 LBS | DIASTOLIC BLOOD PRESSURE: 97 MMHG | BODY MASS INDEX: 21.47 KG/M2 | HEART RATE: 55 BPM | OXYGEN SATURATION: 98 % | SYSTOLIC BLOOD PRESSURE: 154 MMHG | HEIGHT: 70 IN

## 2024-12-02 DIAGNOSIS — R97.20 ELEVATED PSA: Chronic | ICD-10-CM

## 2024-12-02 DIAGNOSIS — K59.00 CONSTIPATION, UNSPECIFIED CONSTIPATION TYPE: ICD-10-CM

## 2024-12-02 DIAGNOSIS — G62.9 NEUROPATHY: ICD-10-CM

## 2024-12-02 DIAGNOSIS — J44.1 COPD EXACERBATION (MULTI): ICD-10-CM

## 2024-12-02 DIAGNOSIS — Z98.890 S/P FOOT SURGERY, RIGHT: ICD-10-CM

## 2024-12-02 DIAGNOSIS — M54.2 CERVICAL PAIN (NECK): ICD-10-CM

## 2024-12-02 DIAGNOSIS — J44.9 COPD, MODERATE (MULTI): Primary | Chronic | ICD-10-CM

## 2024-12-02 PROCEDURE — 99214 OFFICE O/P EST MOD 30 MIN: CPT | Performed by: STUDENT IN AN ORGANIZED HEALTH CARE EDUCATION/TRAINING PROGRAM

## 2024-12-02 PROCEDURE — 4004F PT TOBACCO SCREEN RCVD TLK: CPT | Performed by: STUDENT IN AN ORGANIZED HEALTH CARE EDUCATION/TRAINING PROGRAM

## 2024-12-02 PROCEDURE — 72040 X-RAY EXAM NECK SPINE 2-3 VW: CPT | Performed by: RADIOLOGY

## 2024-12-02 PROCEDURE — 3008F BODY MASS INDEX DOCD: CPT | Performed by: STUDENT IN AN ORGANIZED HEALTH CARE EDUCATION/TRAINING PROGRAM

## 2024-12-02 PROCEDURE — 1159F MED LIST DOCD IN RCRD: CPT | Performed by: STUDENT IN AN ORGANIZED HEALTH CARE EDUCATION/TRAINING PROGRAM

## 2024-12-02 PROCEDURE — 72040 X-RAY EXAM NECK SPINE 2-3 VW: CPT

## 2024-12-02 PROCEDURE — 1157F ADVNC CARE PLAN IN RCRD: CPT | Performed by: STUDENT IN AN ORGANIZED HEALTH CARE EDUCATION/TRAINING PROGRAM

## 2024-12-02 PROCEDURE — 1160F RVW MEDS BY RX/DR IN RCRD: CPT | Performed by: STUDENT IN AN ORGANIZED HEALTH CARE EDUCATION/TRAINING PROGRAM

## 2024-12-02 PROCEDURE — 1123F ACP DISCUSS/DSCN MKR DOCD: CPT | Performed by: STUDENT IN AN ORGANIZED HEALTH CARE EDUCATION/TRAINING PROGRAM

## 2024-12-02 RX ORDER — PREDNISONE 20 MG/1
40 TABLET ORAL DAILY
Qty: 10 TABLET | Refills: 0 | Status: SHIPPED | OUTPATIENT
Start: 2024-12-02 | End: 2024-12-07

## 2024-12-02 RX ORDER — DOXYCYCLINE 100 MG/1
100 CAPSULE ORAL 2 TIMES DAILY
Qty: 14 CAPSULE | Refills: 0 | Status: SHIPPED | OUTPATIENT
Start: 2024-12-02 | End: 2024-12-09

## 2024-12-02 RX ORDER — GLUCOSAM/CHONDRO/HERB 149/HYAL 750-100 MG
TABLET ORAL
COMMUNITY

## 2024-12-02 RX ORDER — VIT C/E/ZN/COPPR/LUTEIN/ZEAXAN 250MG-90MG
25 CAPSULE ORAL DAILY
COMMUNITY

## 2024-12-02 RX ORDER — MELOXICAM 15 MG/1
15 TABLET ORAL DAILY PRN
Qty: 30 TABLET | Refills: 2 | Status: SHIPPED | OUTPATIENT
Start: 2024-12-02

## 2024-12-02 RX ORDER — CALCIUM CARB, CITRATE, MALATE 250 MG
400 CAPSULE ORAL DAILY
Qty: 90 CAPSULE | Refills: 3 | Status: SHIPPED | OUTPATIENT
Start: 2024-12-02

## 2024-12-02 ASSESSMENT — ENCOUNTER SYMPTOMS
ARTHRALGIAS: 1
COUGH: 1
DECREASED CONCENTRATION: 0
PALPITATIONS: 0
LOSS OF SENSATION IN FEET: 0
CONSTIPATION: 1
DIFFICULTY URINATING: 1
CONFUSION: 0
DYSURIA: 0
DEPRESSION: 0
FEVER: 0
WHEEZING: 0
FLANK PAIN: 0
FATIGUE: 1
SHORTNESS OF BREATH: 1
ACTIVITY CHANGE: 1
OCCASIONAL FEELINGS OF UNSTEADINESS: 0

## 2024-12-02 NOTE — ASSESSMENT & PLAN NOTE
Labs ordered from Dr. Mac back in feb x2, patient has not completed  Strongly recommended to complete these labs and follow up given worsened symptoms

## 2024-12-02 NOTE — ASSESSMENT & PLAN NOTE
Moderate degree of 2024 PFTS  Maintanece- Breztri   Failed therapy- trelegy and symbicort 2/2 cost   PRN- albuterol   No recent hospitalizations, steroid use  Not interested in smoking cessation

## 2024-12-02 NOTE — PROGRESS NOTES
"Patient Name:  James Zhu  MRN:  [id number]  :  1950    Subjective   Patient ID: James Zhu is a 74 y.o. male who presents for Acute Visit (Pt is here for sciatica pain (says it feel like some one hit him in the neck with a bat and feet neuropathy), breathing difficulties due to copd.  Prostate problems and problems having a BM.  Wants everything checked out.  Has been having cp and feels like he got punched so wants his lungs checked.).    HPI     73 yo male presents for sick visit but has multiple concerns     Hx of seeing Dr. Mac for elevated PSA  Follow up scheduled and PSA ordered, but patient has not completed    Ongoing COPD concerns   Does follow with Pulm but unable to get sooner appt     Asking about neuropathy workup  I ordered an EMG back in Feb but the patient did not schedule this  Encouraged to schedule     Neck pain for about 3 months or so  Hard to turn his head when driving, feels like he is sleeping on it funny    Review of Systems   Constitutional:  Positive for activity change and fatigue. Negative for fever.   HENT:  Negative for congestion.    Respiratory:  Positive for cough and shortness of breath. Negative for wheezing.    Cardiovascular:  Negative for chest pain, palpitations and leg swelling.   Gastrointestinal:  Positive for constipation.   Genitourinary:  Positive for difficulty urinating. Negative for dysuria and flank pain.   Musculoskeletal:  Positive for arthralgias.   Neurological:         Neuropathy    Psychiatric/Behavioral:  Negative for confusion and decreased concentration.      Objective   BP (!) 154/97 (BP Location: Left arm, Patient Position: Sitting)   Pulse 55   Ht 1.778 m (5' 10\")   Wt 68 kg (150 lb)   SpO2 98%   BMI 21.52 kg/m²     Physical Exam  Constitutional:       Appearance: Normal appearance.   HENT:      Head: Normocephalic and atraumatic.   Cardiovascular:      Rate and Rhythm: Normal rate and regular rhythm.   Pulmonary:      Effort: Pulmonary " effort is normal.      Breath sounds: Wheezing present.      Comments: Diffuse wheezing, no focal findings  Neurological:      Mental Status: He is alert and oriented to person, place, and time.   Psychiatric:         Mood and Affect: Mood normal.         Behavior: Behavior normal.     Assessment/Plan   Problem List Items Addressed This Visit             ICD-10-CM    COPD exacerbation (Multi) - Primary J44.1     Moderate degree of 2024 PFTS  Maintanece- Breztri   Failed therapy- trelegy and symbicort 2/2 cost   PRN- albuterol   No recent hospitalizations, steroid use  Not interested in smoking cessation          Relevant Medications    predniSONE (Deltasone) 20 mg tablet    doxycycline (Vibramycin) 100 mg capsule    Elevated PSA (Chronic) R97.20     Labs ordered from Dr. Mac back in feb x2, patient has not completed  Strongly recommended to complete these labs and follow up given worsened symptoms          S/P foot surgery, right Z98.890    Relevant Medications    meloxicam (Mobic) 15 mg tablet    Neuropathy G62.9     Please complete your EMG          Relevant Medications    magnesium citrate and oxide (magnesium citrate,mag oxide) 250 mg capsule     Other Visit Diagnoses         Codes    Constipation, unspecified constipation type     K59.00    Relevant Medications    magnesium citrate and oxide (magnesium citrate,mag oxide) 250 mg capsule    Cervical pain (neck)     M54.2    Relevant Orders    XR cervical spine 2-3 views          Steroid for COPD flare, may also help with cervical neck pain  Obtain imaging  Defers PT for the time being  Ok to PRN mobic with taking food

## 2024-12-02 NOTE — PATIENT INSTRUCTIONS
For constipation generally I recommend:  Miralax daily, drink plenty of water to help move stool through  Colace can soften hard stools  Prunes, or prune juice. Apples or apple juice can also sometimes help.   Magnesium citrate supplement 400mg    Medications sent for COPD  Please schedule your nerve test- EMG   Follow up with Dr. Bubba GOMEZ  Xray ordered, ok to restart meloxicam   Start magnesium supplement

## 2024-12-04 ENCOUNTER — TELEPHONE (OUTPATIENT)
Dept: PRIMARY CARE | Facility: CLINIC | Age: 74
End: 2024-12-04
Payer: MEDICARE

## 2024-12-04 NOTE — TELEPHONE ENCOUNTER
We will try for the TENs but with his insurance I am not positive about coverage. Paperwork completed for hong and on Gaby's desk.

## 2024-12-04 NOTE — TELEPHONE ENCOUNTER
----- Message from Flavia Hightower sent at 12/4/2024  7:42 AM EST -----  Mild degree of facet and disc disease of the neck. Would recommend PT or we can try to see if we can get covered for a tens unit for him.

## 2024-12-11 ENCOUNTER — TELEPHONE (OUTPATIENT)
Dept: PRIMARY CARE | Facility: CLINIC | Age: 74
End: 2024-12-11
Payer: MEDICARE

## 2024-12-11 DIAGNOSIS — M54.2 CERVICAL PAIN (NECK): Primary | ICD-10-CM

## 2024-12-11 NOTE — TELEPHONE ENCOUNTER
He can use OTC lidocaine patches or voltaren gel. I placed an order for physical therapy, and he can use the TENs unit.

## 2024-12-11 NOTE — TELEPHONE ENCOUNTER
Medication is not helping , he can't sleep , has a hard time turning head to left  His TENS unit was delivered , but has not used it yet  Asking for any other recommendation

## 2024-12-18 ENCOUNTER — EVALUATION (OUTPATIENT)
Dept: PHYSICAL THERAPY | Facility: HOSPITAL | Age: 74
End: 2024-12-18
Payer: MEDICARE

## 2024-12-18 DIAGNOSIS — M53.82 IMPAIRED RANGE OF MOTION OF CERVICAL SPINE: Primary | ICD-10-CM

## 2024-12-18 DIAGNOSIS — F33.2 SEVERE EPISODE OF RECURRENT MAJOR DEPRESSIVE DISORDER, WITHOUT PSYCHOTIC FEATURES (MULTI): Primary | ICD-10-CM

## 2024-12-18 DIAGNOSIS — M54.2 CERVICAL PAIN (NECK): ICD-10-CM

## 2024-12-18 PROCEDURE — 97161 PT EVAL LOW COMPLEX 20 MIN: CPT | Mod: GP | Performed by: PHYSICAL THERAPIST

## 2024-12-18 PROCEDURE — 97110 THERAPEUTIC EXERCISES: CPT | Mod: GP | Performed by: PHYSICAL THERAPIST

## 2024-12-18 ASSESSMENT — PATIENT HEALTH QUESTIONNAIRE - PHQ9
2. FEELING DOWN, DEPRESSED OR HOPELESS: NEARLY EVERY DAY
3. TROUBLE FALLING OR STAYING ASLEEP OR SLEEPING TOO MUCH: NEARLY EVERY DAY
4. FEELING TIRED OR HAVING LITTLE ENERGY: NEARLY EVERY DAY
7. TROUBLE CONCENTRATING ON THINGS, SUCH AS READING THE NEWSPAPER OR WATCHING TELEVISION: NEARLY EVERY DAY
8. MOVING OR SPEAKING SO SLOWLY THAT OTHER PEOPLE COULD HAVE NOTICED. OR THE OPPOSITE, BEING SO FIGETY OR RESTLESS THAT YOU HAVE BEEN MOVING AROUND A LOT MORE THAN USUAL: NOT AT ALL
SUM OF ALL RESPONSES TO PHQ9 QUESTIONS 1 AND 2: 6
5. POOR APPETITE OR OVEREATING: NEARLY EVERY DAY
SUM OF ALL RESPONSES TO PHQ QUESTIONS 1-9: 21
10. IF YOU CHECKED OFF ANY PROBLEMS, HOW DIFFICULT HAVE THESE PROBLEMS MADE IT FOR YOU TO DO YOUR WORK, TAKE CARE OF THINGS AT HOME, OR GET ALONG WITH OTHER PEOPLE: EXTREMELY DIFFICULT
9. THOUGHTS THAT YOU WOULD BE BETTER OFF DEAD, OR OF HURTING YOURSELF: NOT AT ALL
6. FEELING BAD ABOUT YOURSELF - OR THAT YOU ARE A FAILURE OR HAVE LET YOURSELF OR YOUR FAMILY DOWN: NEARLY EVERY DAY
1. LITTLE INTEREST OR PLEASURE IN DOING THINGS: NEARLY EVERY DAY

## 2024-12-18 ASSESSMENT — ENCOUNTER SYMPTOMS
LOSS OF SENSATION IN FEET: 0
DEPRESSION: 1
OCCASIONAL FEELINGS OF UNSTEADINESS: 0

## 2024-12-18 ASSESSMENT — PAIN DESCRIPTION - DESCRIPTORS: DESCRIPTORS: SHARP

## 2024-12-18 ASSESSMENT — PAIN - FUNCTIONAL ASSESSMENT: PAIN_FUNCTIONAL_ASSESSMENT: 0-10

## 2024-12-18 ASSESSMENT — PAIN SCALES - GENERAL: PAINLEVEL_OUTOF10: 0 - NO PAIN

## 2024-12-18 NOTE — PROGRESS NOTES
Patient accompanied his wife to appt today, his PT called earlier with depression concerns  PHQ-21   We discussed collaborative care, he would like to see Jose Eduardo, his wife highly recommends.

## 2024-12-18 NOTE — PROGRESS NOTES
Physical Therapy    Physical Therapy Evaluation and Treatment      Patient Name: James Zhu  MRN: [id number]  : 1950   Today's Date: 2024  Time Calculation  Start Time: 1000  Stop Time: 1050  Time Calculation (min): 50 min     PT Evaluation Time Entry  PT Evaluation (Low) Time Entry: 40  PT Therapeutic Procedures Time Entry  Therapeutic Exercise Time Entry: 10      Visit # 1    Assessment: Pt presents with left-sided neck pain, s/s consistent with upper trapezius muscle spasm brought on by forward head posture. Pt would benefit from skilled PT to address these limitations for improved function and quality of life.    PT Assessment Results: Decreased range of motion, Pain  Rehab Prognosis: Excellent  Barriers to Participation: Insurance, Comorbidities    Plan:  Treatment/Interventions: Education/ Instruction, Manual therapy, Therapeutic exercises  PT Plan: Skilled PT  PT Frequency: Other (Comment) (1-2 f/u visits for HEP progression)  Duration: 4-6 wks  Rehab Potential: Excellent  Plan of Care Agreement: Patient    Current Problem:   1. Impaired range of motion of cervical spine  Follow Up In Physical Therapy      2. Cervical pain (neck)  Referral to Physical Therapy    Follow Up In Physical Therapy          Subjective      Chief complaint: Pt states when he sits too long he has left-sided neck pain with turning his head. Movement helps and has been doing his own HEP that has helped control the pain though it hasn't gone away after several months of his exercises. Denies radicular symptoms.     Pain Better: motion/exercise, heat    Pain Worse: sitting too long    Imaging: Mild spondylotic changes at C5-6. Facet disease.     Prior level of function: independent     Current limitations: sleeping, sitting, head turns    Work: Retired    Patient's goal: decrease pain    Precautions:  Precautions  STEADI Fall Risk Score (The score of 4 or more indicates an increased risk of falling): 2  Precautions Comment:  COPD, macular degeneration/vision problems    Pain:  Pain Assessment  Pain Assessment: 0-10  0-10 (Numeric) Pain Score: 0 - No pain (10/10 max)  Pain Type: Chronic pain  Pain Location: Neck  Pain Orientation: Left  Pain Descriptors: Sharp  Pain Frequency: Intermittent    Objective:  Objective   Posture: Forward head, elevated scapula jamaal    Cervical ROM  FLEX:  EXT:  ROT: 50 deg L, 33 deg R  SB: 25 deg L, 18 deg R    UE Strength:  WNL    Scapula Stabilizer Strength:  NT    Cervical compression: NT  Cervical decompression: NT    Deep neck flexor test: NT    Tender to palpation left upper trap, cervical paraspinals     Outcome Measures:  Other Measures  Neck Disability Index: 7     Treatments:  EXERCISES Date  Date  Date Date   VISIT# # # # #    REPS REPS REPS REPS          Review HEP              Add to HEP:       Cervical isometrics              T-band:       Pull downs       Mid rows       Jamaal ER              Corner pec stretch                                   HEP: Access Code: [email or web address]  Exercises  - Seated Upper Trapezius Stretch (Mirrored)  - 2 x daily - 3 reps - 30 seconds hold  - Gentle Levator Scapulae Stretch  - 2 x daily - 3 reps - 30 seconds hold  - Seated Scapular Retraction  - 2 x daily - 1-2 sets - 10 reps - 3 seconds hold  - Seated Cervical Retraction Protraction AROM  - 2 x daily - 1-2 sets - 10 reps -  seconds hold          Goals:  Active       Left neck pain       <=1/10 neck pain for improved daily activities.        Start:  12/18/24    Expected End:  03/18/25            Improve cervical AROM to WNL all directions for safe driving.         Start:  12/18/24    Expected End:  03/18/25            Pt will demonstrate improved sitting posture to decrease stress on neck and back.        Start:  12/18/24    Expected End:  03/18/25            Improve NDI by 6 points for improved mobility.        Start:  12/18/24    Expected End:  03/18/25            Independent HEP for continued gains after PT is complete.         Start:  12/18/24    Expected End:  03/18/25

## 2025-01-02 ENCOUNTER — TELEPHONE (OUTPATIENT)
Dept: PRIMARY CARE | Facility: CLINIC | Age: 75
End: 2025-01-02
Payer: MEDICARE

## 2025-01-02 DIAGNOSIS — M54.2 CERVICAL PAIN (NECK): Primary | ICD-10-CM

## 2025-01-02 DIAGNOSIS — G62.9 NEUROPATHY: ICD-10-CM

## 2025-01-14 ENCOUNTER — DOCUMENTATION (OUTPATIENT)
Dept: PHYSICAL THERAPY | Facility: HOSPITAL | Age: 75
End: 2025-01-14
Payer: MEDICARE

## 2025-01-14 NOTE — PROGRESS NOTES
Physical Therapy                 Therapy Communication Note    Patient Name: James Zhu  MRN: [id number]  : 1950   Today's Date: 2025     Discipline: Physical Therapy    Missed Time: No Show    Comment: Called pt, states he has an emergency at home. Feels his HEP given at his last visit has helped control his cervical pain and he would like to be discharged from PT at this time.

## 2025-02-18 PROBLEM — N13.8 BENIGN PROSTATIC HYPERPLASIA WITH URINARY OBSTRUCTION: Status: ACTIVE | Noted: 2017-08-23

## 2025-02-18 PROBLEM — R06.02 SHORTNESS OF BREATH: Status: ACTIVE | Noted: 2025-02-18

## 2025-02-18 PROBLEM — N40.1 BENIGN PROSTATIC HYPERPLASIA WITH URINARY OBSTRUCTION: Status: ACTIVE | Noted: 2017-08-23

## 2025-02-18 PROBLEM — R97.20 RISING PSA LEVEL: Status: ACTIVE | Noted: 2017-09-26

## 2025-02-18 PROBLEM — J45.909 ASTHMA: Status: ACTIVE | Noted: 2023-01-09

## 2025-02-18 PROBLEM — R53.83 FATIGUE: Status: ACTIVE | Noted: 2022-11-30

## 2025-02-18 PROBLEM — T83.511A URINARY TRACT INFECTION ASSOCIATED WITH CATHETERIZATION OF URINARY TRACT (CMS-HCC): Status: ACTIVE | Noted: 2022-10-13

## 2025-02-18 PROBLEM — M25.579 ANKLE PAIN: Status: ACTIVE | Noted: 2025-02-18

## 2025-02-18 PROBLEM — N39.0 URINARY TRACT INFECTION ASSOCIATED WITH CATHETERIZATION OF URINARY TRACT (CMS-HCC): Status: ACTIVE | Noted: 2022-10-13

## 2025-02-18 PROBLEM — M77.40 METATARSALGIA: Status: ACTIVE | Noted: 2025-02-18

## 2025-02-18 PROBLEM — N40.0 BENIGN PROSTATIC HYPERPLASIA: Status: ACTIVE | Noted: 2022-05-05

## 2025-02-18 PROBLEM — R73.09 ABNORMAL GLUCOSE LEVEL: Status: ACTIVE | Noted: 2022-11-30

## 2025-02-18 PROBLEM — R06.09 DYSPNEA ON EXERTION: Status: ACTIVE | Noted: 2022-10-13

## 2025-02-18 PROBLEM — M72.2 CONTRACTURE OF PLANTAR FASCIA: Status: ACTIVE | Noted: 2023-06-16

## 2025-02-18 PROBLEM — R78.81 GRAM-NEGATIVE BACTEREMIA: Status: ACTIVE | Noted: 2025-02-18

## 2025-02-18 PROBLEM — Z20.822 CONTACT WITH AND (SUSPECTED) EXPOSURE TO COVID-19: Status: ACTIVE | Noted: 2022-10-17

## 2025-02-18 PROBLEM — A41.9 SEPSIS (MULTI): Status: ACTIVE | Noted: 2022-10-17

## 2025-02-18 PROBLEM — R10.9 LEFT SIDED ABDOMINAL PAIN: Status: ACTIVE | Noted: 2022-10-05

## 2025-02-18 PROBLEM — R05.9 COUGH, UNSPECIFIED: Status: ACTIVE | Noted: 2022-05-05

## 2025-02-19 ENCOUNTER — APPOINTMENT (OUTPATIENT)
Dept: PRIMARY CARE | Facility: CLINIC | Age: 75
End: 2025-02-19
Payer: MEDICARE

## 2025-02-19 ENCOUNTER — OFFICE VISIT (OUTPATIENT)
Dept: PRIMARY CARE | Facility: CLINIC | Age: 75
End: 2025-02-19
Payer: MEDICARE

## 2025-02-19 VITALS
DIASTOLIC BLOOD PRESSURE: 79 MMHG | WEIGHT: 146 LBS | BODY MASS INDEX: 20.9 KG/M2 | HEIGHT: 70 IN | OXYGEN SATURATION: 98 % | SYSTOLIC BLOOD PRESSURE: 145 MMHG | HEART RATE: 64 BPM

## 2025-02-19 DIAGNOSIS — Z00.00 MEDICARE ANNUAL WELLNESS VISIT, SUBSEQUENT: Primary | ICD-10-CM

## 2025-02-19 DIAGNOSIS — E78.5 DYSLIPIDEMIA: ICD-10-CM

## 2025-02-19 DIAGNOSIS — R73.09 ABNORMAL GLUCOSE LEVEL: ICD-10-CM

## 2025-02-19 DIAGNOSIS — G60.9 HEREDITARY AND IDIOPATHIC NEUROPATHY, UNSPECIFIED: ICD-10-CM

## 2025-02-19 DIAGNOSIS — E67.3 HYPERVITAMINOSIS D: ICD-10-CM

## 2025-02-19 DIAGNOSIS — Z98.890 S/P FOOT SURGERY, RIGHT: ICD-10-CM

## 2025-02-19 DIAGNOSIS — H91.90 SUBJECTIVE HEARING CHANGE: ICD-10-CM

## 2025-02-19 DIAGNOSIS — Z13.6 SCREENING FOR AAA (ABDOMINAL AORTIC ANEURYSM): ICD-10-CM

## 2025-02-19 DIAGNOSIS — J44.1 COPD EXACERBATION (MULTI): Chronic | ICD-10-CM

## 2025-02-19 DIAGNOSIS — R03.0 ELEVATED BP WITHOUT DIAGNOSIS OF HYPERTENSION: ICD-10-CM

## 2025-02-19 DIAGNOSIS — Z72.0 TOBACCO ABUSE: ICD-10-CM

## 2025-02-19 DIAGNOSIS — G62.9 NEUROPATHY: ICD-10-CM

## 2025-02-19 DIAGNOSIS — Z12.11 ENCOUNTER FOR SCREENING FOR MALIGNANT NEOPLASM OF COLON: ICD-10-CM

## 2025-02-19 PROBLEM — A41.9 SEPSIS (MULTI): Status: RESOLVED | Noted: 2022-10-17 | Resolved: 2025-02-19

## 2025-02-19 PROBLEM — Z20.822 CONTACT WITH AND (SUSPECTED) EXPOSURE TO COVID-19: Status: RESOLVED | Noted: 2022-10-17 | Resolved: 2025-02-19

## 2025-02-19 PROBLEM — R78.81 GRAM-NEGATIVE BACTEREMIA: Status: RESOLVED | Noted: 2025-02-18 | Resolved: 2025-02-19

## 2025-02-19 PROCEDURE — 94640 AIRWAY INHALATION TREATMENT: CPT | Performed by: STUDENT IN AN ORGANIZED HEALTH CARE EDUCATION/TRAINING PROGRAM

## 2025-02-19 PROCEDURE — 3008F BODY MASS INDEX DOCD: CPT | Performed by: STUDENT IN AN ORGANIZED HEALTH CARE EDUCATION/TRAINING PROGRAM

## 2025-02-19 PROCEDURE — 99214 OFFICE O/P EST MOD 30 MIN: CPT | Performed by: STUDENT IN AN ORGANIZED HEALTH CARE EDUCATION/TRAINING PROGRAM

## 2025-02-19 PROCEDURE — 1157F ADVNC CARE PLAN IN RCRD: CPT | Performed by: STUDENT IN AN ORGANIZED HEALTH CARE EDUCATION/TRAINING PROGRAM

## 2025-02-19 PROCEDURE — 1170F FXNL STATUS ASSESSED: CPT | Performed by: STUDENT IN AN ORGANIZED HEALTH CARE EDUCATION/TRAINING PROGRAM

## 2025-02-19 PROCEDURE — 1158F ADVNC CARE PLAN TLK DOCD: CPT | Performed by: STUDENT IN AN ORGANIZED HEALTH CARE EDUCATION/TRAINING PROGRAM

## 2025-02-19 PROCEDURE — 1160F RVW MEDS BY RX/DR IN RCRD: CPT | Performed by: STUDENT IN AN ORGANIZED HEALTH CARE EDUCATION/TRAINING PROGRAM

## 2025-02-19 PROCEDURE — 1159F MED LIST DOCD IN RCRD: CPT | Performed by: STUDENT IN AN ORGANIZED HEALTH CARE EDUCATION/TRAINING PROGRAM

## 2025-02-19 PROCEDURE — 1123F ACP DISCUSS/DSCN MKR DOCD: CPT | Performed by: STUDENT IN AN ORGANIZED HEALTH CARE EDUCATION/TRAINING PROGRAM

## 2025-02-19 PROCEDURE — 99397 PER PM REEVAL EST PAT 65+ YR: CPT | Performed by: STUDENT IN AN ORGANIZED HEALTH CARE EDUCATION/TRAINING PROGRAM

## 2025-02-19 PROCEDURE — G0439 PPPS, SUBSEQ VISIT: HCPCS | Performed by: STUDENT IN AN ORGANIZED HEALTH CARE EDUCATION/TRAINING PROGRAM

## 2025-02-19 RX ORDER — PREDNISONE 20 MG/1
40 TABLET ORAL DAILY
Qty: 10 TABLET | Refills: 0 | Status: SHIPPED | OUTPATIENT
Start: 2025-02-19 | End: 2025-02-24

## 2025-02-19 RX ORDER — IPRATROPIUM BROMIDE AND ALBUTEROL SULFATE 2.5; .5 MG/3ML; MG/3ML
3 SOLUTION RESPIRATORY (INHALATION) ONCE
Status: COMPLETED | OUTPATIENT
Start: 2025-02-19 | End: 2025-02-19

## 2025-02-19 RX ADMIN — IPRATROPIUM BROMIDE AND ALBUTEROL SULFATE 3 ML: 2.5; .5 SOLUTION RESPIRATORY (INHALATION) at 15:06

## 2025-02-19 ASSESSMENT — PATIENT HEALTH QUESTIONNAIRE - PHQ9
2. FEELING DOWN, DEPRESSED OR HOPELESS: NOT AT ALL
1. LITTLE INTEREST OR PLEASURE IN DOING THINGS: NOT AT ALL
SUM OF ALL RESPONSES TO PHQ9 QUESTIONS 1 AND 2: 0

## 2025-02-19 ASSESSMENT — ENCOUNTER SYMPTOMS
FEVER: 0
DYSURIA: 0
HEMATURIA: 0
ARTHRALGIAS: 1
FLANK PAIN: 0
CONFUSION: 0
DEPRESSION: 0
FREQUENCY: 1
FATIGUE: 1
OCCASIONAL FEELINGS OF UNSTEADINESS: 0
PALPITATIONS: 0
DECREASED CONCENTRATION: 0
WHEEZING: 1
LOSS OF SENSATION IN FEET: 0
COUGH: 0
SHORTNESS OF BREATH: 0

## 2025-02-19 ASSESSMENT — ACTIVITIES OF DAILY LIVING (ADL)
MANAGING_FINANCES: INDEPENDENT
GROCERY_SHOPPING: INDEPENDENT
BATHING: INDEPENDENT
TAKING_MEDICATION: INDEPENDENT
DRESSING: INDEPENDENT
DOING_HOUSEWORK: INDEPENDENT

## 2025-02-19 NOTE — ASSESSMENT & PLAN NOTE
Orders:    Vitamin D 25-Hydroxy,Total (for eval of Vitamin D levels); Future    CBC and Auto Differential; Future    TSH with reflex to Free T4 if abnormal; Future    Vitamin B12; Future

## 2025-02-19 NOTE — PROGRESS NOTES
Subjective   Reason for Visit: James Zhu is an 74 y.o. male here for a Medicare Wellness visit.     Past Medical, Surgical, and Family History reviewed and updated in chart.    Reviewed all medications by prescribing practitioner or clinical pharmacist (such as prescriptions, OTCs, herbal therapies and supplements) and documented in the medical record.    HPI    75 yo male presents for follow up and medicare wellness visit   Has been some time since last labs will update     Some urology concerns- upcoming appt with Dr. Mac    Due for colonoscopy, also having some bowel concerns     Needs hearing test     Back pain continues  Was discharged from PT due to no shows     Has not scheduled EMG, now  needs new order   Feet are bothering him more  Hx of foot surgery would like another opinion     Patient Care Team:  Flavia Hightower DO as PCP - General (Family Medicine)  Alesia Villa MD as PCP - [employer] PCP  HENNA Mishra-CNP as Nurse Practitioner (Pulmonary Disease)  Oleksandr Banuelos MD as Surgeon (Urology)     Past Medical History:   Diagnosis Date    AAA (abdominal aortic aneurysm) (CMS-[facility])     BPH (benign prostatic hyperplasia)     CAD (coronary artery disease)     Contact with and (suspected) exposure to covid-19 10/17/2022    COPD (chronic obstructive pulmonary disease) (Multi)     Elevated PSA     GERD (gastroesophageal reflux disease)     Sepsis (Multi) 10/17/2022     Past Surgical History:   Procedure Laterality Date    APPENDECTOMY      CARDIAC CATHETERIZATION      FOOT SURGERY       Family History   Problem Relation Name Age of Onset    COPD Mother      Stroke Mother      Heart attack Father       Body mass index is 20.95 kg/m².    Tobacco/Alcohol/Opioid use, as well as Illicit Drug Use was screened for/reviewed and documented in Social History section and medication list as appropriate    Medications and Supplements  prescribed by me and other practitioners or clinical  "pharmacist (such as prescriptions, OTC's, herbal therapies and supplements) were reviewed and documented in the medical record.    Tobacco/Alcohol/Opioid use, as well as Illicit Drug Use was screened for/reviewed and documented in Social History section and medication list as appropriate    Review of Systems   Constitutional:  Positive for fatigue. Negative for fever.   HENT:  Positive for hearing loss.    Respiratory:  Positive for wheezing. Negative for cough and shortness of breath.    Cardiovascular:  Negative for chest pain, palpitations and leg swelling.   Genitourinary:  Positive for frequency. Negative for dysuria, flank pain and hematuria.   Musculoskeletal:  Positive for arthralgias.   Allergic/Immunologic: Negative for immunocompromised state.   Neurological:         Neuropathy of the feet   Psychiatric/Behavioral:  Negative for confusion and decreased concentration.      Objective   Vitals:  /79 (BP Location: Left arm, Patient Position: Sitting)   Pulse 64   Ht 1.778 m (5' 10\")   Wt 66.2 kg (146 lb)   SpO2 98%   BMI 20.95 kg/m²       Physical Exam  Constitutional:       Appearance: Normal appearance.   HENT:      Head: Normocephalic and atraumatic.   Cardiovascular:      Rate and Rhythm: Normal rate and regular rhythm.   Pulmonary:      Effort: Pulmonary effort is normal.      Breath sounds: Wheezing present.      Comments: Global expiratory wheezing   Musculoskeletal:      Right lower leg: No edema.      Left lower leg: No edema.   Skin:     Coloration: Skin is not jaundiced or pale.   Neurological:      General: No focal deficit present.      Mental Status: He is alert and oriented to person, place, and time.   Psychiatric:         Mood and Affect: Mood normal.         Behavior: Behavior normal.       Assessment & Plan  Medicare annual wellness visit, subsequent  PNEUMONIA vaccine- Completed   SHINGLES vaccine- Completed     Screening tests:  Colon cancer screening--> Due this May   Lung " Cancer screening--> Due upcoming, [facility] has already ordered  Prostate Cancer Screening--> managed by Dr. Mac   AAA screening- Ordered     During the course of the visit the patient was educated and counseled about age appropriate screening and preventive services. Completed preventive screenings were documented in the chart and orders were placed for outstanding screenings/procedures as documented in the Assessment and Plan.    Patient Instructions (the written plan) was given to the patient at check out that include any community based lifestyle interventions.    Other risk factors and conditions for which interventions are recommended are addressed as the other tagged diagnoses in this encounter.        Dyslipidemia  Orders:    Lipid Panel; Future    Comprehensive Metabolic Panel; Future    Abnormal glucose level  Orders:    Hemoglobin A1C; Future    Neuropathy  Orders:    Vitamin D 25-Hydroxy,Total (for eval of Vitamin D levels); Future    CBC and Auto Differential; Future    TSH with reflex to Free T4 if abnormal; Future    Vitamin B12; Future    Hypervitaminosis D  Orders:    Vitamin D 25-Hydroxy,Total (for eval of Vitamin D levels); Future    Hereditary and idiopathic neuropathy, unspecified  Orders:    TSH with reflex to Free T4 if abnormal; Future    EMG & nerve conduction; Future    Referral to Podiatry; Future    Screening for AAA (abdominal aortic aneurysm)  Orders:    Vascular US Abdominal Aorta Aneurysm AAA Screening; Future    Tobacco abuse  Orders:    Vascular US Abdominal Aorta Aneurysm AAA Screening; Future    Subjective hearing change  Orders:    Referral to Audiology; Future    Encounter for screening for malignant neoplasm of colon  Orders:    Colonoscopy Screening; Average Risk Patient; Future    S/P foot surgery, right  Orders:    Referral to Podiatry; Future    COPD exacerbation (Multi)  Orders:    predniSONE (Deltasone) 20 mg tablet; Take 2 tablets (40 mg) by mouth once daily for 5 days.     ipratropium-albuteroL (Duo-Neb) 0.5-2.5 mg/3 mL nebulizer solution 3 mL    Elevated BP without diagnosis of hypertension  Orders:    Follow Up In [facility] - PCP - Established; Future

## 2025-02-19 NOTE — PATIENT INSTRUCTIONS
Audiology referral for hearing is placed  Colonoscopy referral, due plus new concerns  Urology upcoming appointment  AAA screening due, ordered   Pulmonary team has ordered Lung cancer screening   Podiatry referral and EMG testing for neuropathy   Prednisone for wheezing   Follow up to recheck BP

## 2025-02-19 NOTE — ASSESSMENT & PLAN NOTE
PNEUMONIA vaccine- Completed   SHINGLES vaccine- Completed     Screening tests:  Colon cancer screening--> Due this May   Lung Cancer screening--> Due upcoming, pulm has already ordered  Prostate Cancer Screening--> managed by Dr. Bubba KULKARNI screening- Ordered     During the course of the visit the patient was educated and counseled about age appropriate screening and preventive services. Completed preventive screenings were documented in the chart and orders were placed for outstanding screenings/procedures as documented in the Assessment and Plan.    Patient Instructions (the written plan) was given to the patient at check out that include any community based lifestyle interventions.    Other risk factors and conditions for which interventions are recommended are addressed as the other tagged diagnoses in this encounter.

## 2025-02-19 NOTE — ASSESSMENT & PLAN NOTE
Orders:    predniSONE (Deltasone) 20 mg tablet; Take 2 tablets (40 mg) by mouth once daily for 5 days.    ipratropium-albuteroL (Duo-Neb) 0.5-2.5 mg/3 mL nebulizer solution 3 mL

## 2025-02-21 ENCOUNTER — TELEPHONE (OUTPATIENT)
Facility: CLINIC | Age: 75
End: 2025-02-21
Payer: MEDICARE

## 2025-02-21 NOTE — TELEPHONE ENCOUNTER
[facility] OPEN ACCESS COLONOSCOPY SCREENING FORM       Last Colonoscopy? Where: [facility]  When: two years ago not sure who did colonoscopy  ANESTHESIA SCREENING   1. Height 5'10.5     Weight 150  BMI 20.95    (Clinic Visit if BMI over 40)   2. Are you on any blood thinning medications including  mg? no  ( Clinic visit if yes)  3. Are you on oxygen at home? no (Clinic visit if yes)   4. Have you seen your primary care provider within the last 12 months? 2.19.2025 (Clinic visit if NO)   5. History of:   Pacemaker/Defibrillator no                          Heart Failure no                         Heart Disease no                          Stroke no   Details of cardiac history: Heart attacks 20+ years ago, HLD  (Clinic visit if history of heart failure or new diagnosis/new intervention in last year)     6. Do you have renal failure or require dialysis? no  7. Do you have sleep Apnea? no  8. Are you on insulin for diabetes? no If yes, patient should discuss [place]-procedural medication adjustment with PCP.   9. Are you currently on GLP-1 or SGLT2 inhibitors? no  10. Do you have a history of seizures? no (If YES- indicate recent or NOT recent. Anesthesia to review if recent.)    GI SCREENING   Have you had a positive stool based screening test? (i.e. fecal occult, FIT, or Cologuard) no   ? If yes and pass anesthesia screen, no further questions are needed. Schedule OA procedure.   ? If yes and they do NOT pass anesthesia screen then refer to office for expedited review/visit.   ? If no, proceed to the following GI screening questions to determine if office visit is needed.      If patient answers YES to any of the following please send to the office for an appointment.  1. Do you have chronic diarrhea lasting longer than 3 weeks? no   2. Do you have a large amount of rectal bleeding or frequent rectal bleeding? no  3. Do you have significant, unexplained weight loss? no    Open Access APPROVED  yes    Patient is scheduled with Dr. Goldman 3.10.2025. Packet with Miralax Prep mailed 2.21.2025

## 2025-02-24 ENCOUNTER — HOSPITAL ENCOUNTER (OUTPATIENT)
Dept: RADIOLOGY | Facility: CLINIC | Age: 75
Discharge: HOME | End: 2025-02-24
Payer: MEDICARE

## 2025-02-24 DIAGNOSIS — F17.210 CIGARETTE SMOKER: ICD-10-CM

## 2025-02-24 PROCEDURE — 71271 CT THORAX LUNG CANCER SCR C-: CPT

## 2025-02-24 PROCEDURE — 71271 CT THORAX LUNG CANCER SCR C-: CPT | Performed by: RADIOLOGY

## 2025-02-25 ENCOUNTER — TELEPHONE (OUTPATIENT)
Dept: PULMONOLOGY | Facility: HOSPITAL | Age: 75
End: 2025-02-25
Payer: MEDICARE

## 2025-02-25 ENCOUNTER — HOSPITAL ENCOUNTER (OUTPATIENT)
Dept: VASCULAR MEDICINE | Facility: HOSPITAL | Age: 75
Discharge: HOME | End: 2025-02-25
Payer: MEDICARE

## 2025-02-25 DIAGNOSIS — Z13.6 SCREENING FOR AAA (ABDOMINAL AORTIC ANEURYSM): ICD-10-CM

## 2025-02-25 DIAGNOSIS — Z72.0 TOBACCO ABUSE: ICD-10-CM

## 2025-02-25 PROCEDURE — 76706 US ABDL AORTA SCREEN AAA: CPT

## 2025-02-25 PROCEDURE — 76706 US ABDL AORTA SCREEN AAA: CPT | Performed by: SURGERY

## 2025-02-25 NOTE — TELEPHONE ENCOUNTER
Patient acknowledged understanding. All questions answered at this time. Patient is agreeable to see cardiology.    ----- Message from Jemma Vyas sent at 2/25/2025  1:22 PM EST -----  Please call the patient and let him know that his CT chest was stable and we will continue yearly screening for him. Can you ask him if he follows with a cardiologist? He has severe coronary artery calcifications and I would recommend he see them.

## 2025-02-26 ENCOUNTER — APPOINTMENT (OUTPATIENT)
Dept: AUDIOLOGY | Facility: HOSPITAL | Age: 75
End: 2025-02-26
Payer: MEDICARE

## 2025-02-26 DIAGNOSIS — I25.10 CORONARY ARTERY CALCIFICATION SEEN ON CAT SCAN: Primary | ICD-10-CM

## 2025-02-26 LAB — PSA SERPL-MCNC: 6.56 NG/ML

## 2025-02-27 ENCOUNTER — TELEPHONE (OUTPATIENT)
Dept: PRIMARY CARE | Facility: CLINIC | Age: 75
End: 2025-02-27
Payer: MEDICARE

## 2025-02-27 ENCOUNTER — CLINICAL SUPPORT (OUTPATIENT)
Dept: AUDIOLOGY | Facility: HOSPITAL | Age: 75
End: 2025-02-27
Payer: MEDICARE

## 2025-02-27 DIAGNOSIS — H90.3 SENSORINEURAL HEARING LOSS (SNHL) OF BOTH EARS: ICD-10-CM

## 2025-02-27 DIAGNOSIS — H93.13 SUBJECTIVE TINNITUS OF BOTH EARS: Primary | ICD-10-CM

## 2025-02-27 PROCEDURE — 92550 TYMPANOMETRY & REFLEX THRESH: CPT | Performed by: AUDIOLOGIST

## 2025-02-27 PROCEDURE — 92557 COMPREHENSIVE HEARING TEST: CPT | Performed by: AUDIOLOGIST

## 2025-02-27 ASSESSMENT — PAIN - FUNCTIONAL ASSESSMENT: PAIN_FUNCTIONAL_ASSESSMENT: 0-10

## 2025-02-27 ASSESSMENT — PAIN SCALES - GENERAL: PAINLEVEL_OUTOF10: 0 - NO PAIN

## 2025-02-27 NOTE — TELEPHONE ENCOUNTER
----- Message from Flavia Hightower sent at 2/27/2025 11:07 AM EST -----  While the abdominal aorta was normal, the scan noted dilation of the proximal aorta, although not the best imaging study. Review of 2/25CT chest which does not show dilation. To double check this findings echo orders placed.

## 2025-02-27 NOTE — LETTER
2025         Flavia Hightower DO  [address]  [facility] [address]      Patient: James Zhu   YOB: 1950   Date of Visit: 2025       Dear Flavia Hightower DO:    Thank you for referring James Zhu to me for evaluation. Below are my notes for this consultation.  If you have questions, please do not hesitate to call me. I look forward to following your patient along with you.       Sincerely,     HYACINTH Camargo, CCC-A  Senior Audiologist      CC:   No Recipients  ______________________________________________________________________________________    AUDIOLOGY ADULT AUDIOMETRIC EVALUATION      Name:  James Zhu  :  1950  Age:  74 y.o.  Date of Evaluation:  2025     IMPRESSIONS:  Today's test results are consistent with mild to severe sensorineural hearing loss in the right ear and severe high frequency sensorineural hearing loss in the left ear.  His word discrimination scores are good bilaterally.  His tympanic membrane mobility is within normal limits bilaterally.  There has been mild progression of hearing loss relative to results from .  If there are no medical contraindications, this patient could be considered a good candidate for binaural amplification.     RECOMMENDATIONS:  -Annual audiologic monitoring, or as changes are noted.  -Hearing Aid consultation.  Patient should check with insurance regarding benefits and covered providers.  Written information was provided.    -Use of hearing protection devices whenever loud noises cannot be avoided.  -Enrichment of the sound environment (such as use of soft music, fan, or noise generators) and use of hearing aids to reduce tinnitus annoyance.    ------------------------------------------------    HISTORY:  Reason for visit:  Mr. Zhu is seen today at the request of Flavia Hightower DO for an evaluation of hearing.  Patient complains of Hearing Loss (Gradually  "progressive hearing loss bilaterally. )  He struggles to hear soft-spoken people.  He needs to sit close to the television to hear it.  He does not understand lyrics of songs on the radio.  He is a .  He is asking speakers to repeat themselves.  He feels like his hearing is getting worse.          Tinnitus:   yes, bilateral, occurs continuously, described as \"wind tunnel,\" longstanding, not generally bothersome as he is used to it, not pulsatile  Noise Exposure: yes, history of occupational noise (), played in a band, shooter - uses hearing protection devices   Other significant history: concerns about memory / cognitive problems  Previous hearing test:  yes, from 2-, showing normal low frequency hearing sensitivity sloping to severe high frequency sensorineural hearing loss bilaterally, worse at 1000-2000Hz in the right ear.     Otalgia:  no  Aural Fullness:  no  Otitis Media: no  PE Tubes:  no  Other otologic surgical history:  no  Dizziness:  no  Family history of hearing loss:  no  Hearing aid history: none    EVALUATION    Otoscopic Evaluation:        Mild nonoccluding cerumen in the ear canal, tympanic membrane visualized bilaterally                Pure Tone Audiometry:  Conventional audiometry (125-8000Hz) via insert earphones with good response reliability      Right ear: mild to severe sensorineural hearing loss        Left ear:  normal hearing sensitivity from 125-1000Hz sloping to severe high frequency sensorineural hearing loss     Speech Audiometry:        Right ear:  Speech Reception Threshold (SRT) was obtained at 30 dBHL                 Word Recognition scores were good at 40dBSL re: SRT       Left ear:  Speech Reception Threshold (SRT) was obtained at 25 dBHL                 Word Recognition scores were good at 40dBSL re: SRT      Immittance Measures: 226Hz       Both ears:  Tympanogram shows normal middle ear pressure, static compliance, and ear canal volume.  Acoustic " reflexes were consistent with pure tone results.            Distortion Product Otoacoustic Emissions: Assesses the cochlear outer hair cell function (4569-2994 Hz frequency range)        Both ears:  absent 1500-8000Hz        PATIENT EDUCATION:   Discussed results and recommendations with Mr. Zhu.  Questions were addressed and the patient was encouraged to contact our department should concerns arise.    Verified patient's identification verbally and by armband.    Time:  14:03 to 14:45    HYACINTH Camargo, CCC-A  Senior Audiologist

## 2025-02-27 NOTE — PROGRESS NOTES
"AUDIOLOGY ADULT AUDIOMETRIC EVALUATION      Name:  James Zhu  :  1950  Age:  74 y.o.  Date of Evaluation:  2025     IMPRESSIONS:  Today's test results are consistent with mild to severe sensorineural hearing loss in the right ear and severe high frequency sensorineural hearing loss in the left ear.  His word discrimination scores are good bilaterally.  His tympanic membrane mobility is within normal limits bilaterally.  There has been mild progression of hearing loss relative to results from .  If there are no medical contraindications, this patient could be considered a good candidate for binaural amplification.     RECOMMENDATIONS:  -Annual audiologic monitoring, or as changes are noted.  -Hearing Aid consultation.  Patient should check with insurance regarding benefits and covered providers.  Written information was provided.    -Use of hearing protection devices whenever loud noises cannot be avoided.  -Enrichment of the sound environment (such as use of soft music, fan, or noise generators) and use of hearing aids to reduce tinnitus annoyance.    ------------------------------------------------    HISTORY:  Reason for visit:  Mr. Zhu is seen today at the request of Flavia Hightower DO for an evaluation of hearing.  Patient complains of Hearing Loss (Gradually progressive hearing loss bilaterally. )  He struggles to hear soft-spoken people.  He needs to sit close to the television to hear it.  He does not understand lyrics of songs on the radio.  He is a .  He is asking speakers to repeat themselves.  He feels like his hearing is getting worse.          Tinnitus:   yes, bilateral, occurs continuously, described as \"wind tunnel,\" longstanding, not generally bothersome as he is used to it, not pulsatile  Noise Exposure: yes, history of occupational noise (), played in a band, shooter - uses hearing protection devices   Other significant history: concerns about memory / " cognitive problems  Previous hearing test:  yes, from 2-, showing normal low frequency hearing sensitivity sloping to severe high frequency sensorineural hearing loss bilaterally, worse at 1000-2000Hz in the right ear.     Otalgia:  no  Aural Fullness:  no  Otitis Media: no  PE Tubes:  no  Other otologic surgical history:  no  Dizziness:  no  Family history of hearing loss:  no  Hearing aid history: none    EVALUATION    Otoscopic Evaluation:        Mild nonoccluding cerumen in the ear canal, tympanic membrane visualized bilaterally                Pure Tone Audiometry:  Conventional audiometry (125-8000Hz) via insert earphones with good response reliability      Right ear: mild to severe sensorineural hearing loss        Left ear:  normal hearing sensitivity from 125-1000Hz sloping to severe high frequency sensorineural hearing loss     Speech Audiometry:        Right ear:  Speech Reception Threshold (SRT) was obtained at 30 dBHL                 Word Recognition scores were good at 40dBSL re: SRT       Left ear:  Speech Reception Threshold (SRT) was obtained at 25 dBHL                 Word Recognition scores were good at 40dBSL re: SRT      Immittance Measures: 226Hz       Both ears:  Tympanogram shows normal middle ear pressure, static compliance, and ear canal volume.  Acoustic reflexes were consistent with pure tone results.            Distortion Product Otoacoustic Emissions: Assesses the cochlear outer hair cell function (5677-6189 Hz frequency range)        Both ears:  absent 1500-8000Hz        PATIENT EDUCATION:   Discussed results and recommendations with Mr. Zhu.  Questions were addressed and the patient was encouraged to contact our department should concerns arise.    Verified patient's identification verbally and by armband.    Time:  14:03 to 14:45    HYACINTH Camargo, CCC-A  Senior Audiologist

## 2025-02-28 ENCOUNTER — APPOINTMENT (OUTPATIENT)
Dept: UROLOGY | Facility: CLINIC | Age: 75
End: 2025-02-28
Payer: MEDICARE

## 2025-02-28 VITALS
HEIGHT: 69 IN | HEART RATE: 71 BPM | BODY MASS INDEX: 22.22 KG/M2 | SYSTOLIC BLOOD PRESSURE: 113 MMHG | DIASTOLIC BLOOD PRESSURE: 70 MMHG | WEIGHT: 150 LBS

## 2025-02-28 DIAGNOSIS — R97.20 ELEVATED PSA: Primary | ICD-10-CM

## 2025-02-28 DIAGNOSIS — N40.1 BENIGN PROSTATIC HYPERPLASIA WITH LOWER URINARY TRACT SYMPTOMS, SYMPTOM DETAILS UNSPECIFIED: ICD-10-CM

## 2025-02-28 LAB
POC BILIRUBIN, URINE: NEGATIVE
POC BLOOD, URINE: NEGATIVE
POC GLUCOSE, URINE: NEGATIVE MG/DL
POC KETONES, URINE: NEGATIVE MG/DL
POC LEUKOCYTES, URINE: ABNORMAL
POC NITRITE,URINE: NEGATIVE
POC PH, URINE: 7 PH
POC PROTEIN, URINE: NEGATIVE MG/DL
POC SPECIFIC GRAVITY, URINE: 1.01
POC UROBILINOGEN, URINE: 0.2 EU/DL

## 2025-02-28 PROCEDURE — 1123F ACP DISCUSS/DSCN MKR DOCD: CPT | Performed by: UROLOGY

## 2025-02-28 PROCEDURE — 3008F BODY MASS INDEX DOCD: CPT | Performed by: UROLOGY

## 2025-02-28 PROCEDURE — 99213 OFFICE O/P EST LOW 20 MIN: CPT | Performed by: UROLOGY

## 2025-02-28 PROCEDURE — 1159F MED LIST DOCD IN RCRD: CPT | Performed by: UROLOGY

## 2025-02-28 PROCEDURE — 1157F ADVNC CARE PLAN IN RCRD: CPT | Performed by: UROLOGY

## 2025-02-28 PROCEDURE — 81003 URINALYSIS AUTO W/O SCOPE: CPT | Performed by: UROLOGY

## 2025-02-28 NOTE — PATIENT INSTRUCTIONS
Impression  BPH  Elevated PSA  Kidney stone  Renal cyst     Plan  Continue watch PSA  PSA in the year  Appointment in a year

## 2025-02-28 NOTE — PROGRESS NOTES
02/28/2025  Voiding same, nocturia every hour but does not bother    Patient has no nausea, no vomiting, no fever.    ALCON: Deferred    PSA: Stable    We discussed benign prostate hypertrophy with mild voiding symptom  We discussed the elevated PSA,  We discussed nonobstructive kidney stone  We discussed the renal cysts  All the questions were answered, the patient expressed understanding and agreed to the plan.     Impression  BPH  Elevated PSA  Kidney stone  Renal cyst     Plan  Continue watch PSA  PSA in the year  Appointment in a year    Chief Complaint   Patient presents with    Elevated PSA     Pt is here today for a yearly follow up for elevated PSA, hx of kidney stones and a renal cyst and BPH. He states that he has a weak stream sometimes.        Physical Exam     TODAYS LAB RESULTS:  Lab Results   Component Value Date    PSA 6.56 (H) 02/25/2025    PSA 6.80 (H) 01/11/2021     ntains abnormal data POCT UA Automated manually resulted  Order: [id number]   Status: Final result       Visible to patient: No (inaccessible in [facility])       Next appt: 03/07/2025 at 01:00 PM in Pulmonology (Jemma Vyas, APRN-CNP)       Dx: Benign prostatic hyperplasia with low...    0 Result Notes       Component  Ref Range & Units 10:27 1 yr ago   POC Glucose, Urine  NEGATIVE mg/dl NEGATIVE NEGATIVE   POC Bilirubin, Urine  NEGATIVE NEGATIVE NEGATIVE   POC Ketones, Urine  NEGATIVE mg/dl NEGATIVE NEGATIVE   POC Specific Gravity, Urine  1.005 - 1.035 1.015 1.025   POC Blood, Urine  NEGATIVE NEGATIVE MODERATE (2+) Abnormal    POC PH, Urine  No Reference Range Established PH 7.0 6.5   POC Protein, Urine  NEGATIVE mg/dl NEGATIVE 30 (1+) R   POC Urobilinogen, Urine  0.2, 1.0 EU/DL 0.2 0.2   Poc Nitrite, Urine  NEGATIVE NEGATIVE NEGATIVE   POC Leukocytes, Urine  NEGATIVE TRACE Abnormal  TRACE Abnormal              Specimen Collected: 02/28/25 10:27 Last Resulted: 02/28/25 10:27           ASSESSMENT&PLAN:      IMPRESSIONS:      02/23/2024  Voiding well, nocturia 1-2     Patient has no nausea, no vomiting, no fever.     ALCON: Deferred     PSA: Pending     We discussed benign prostate hypertrophy with mild voiding symptom  We discussed the elevated PSA,  We discussed nonobstructive kidney stone  We discussed the renal cysts  All the questions were answered, the patient expressed understanding and agreed to the plan.     Impression  BPH  Elevated PSA  Kidney stone  Renal cyst     Plan  Check PSA now and in a year  Conservative management for nonobstructive kidney stones and renal cysts  Appointment in 1 year     Patient last seen by Dr. Campos       Chief Complaint   Patient presents with    Nephrolithiasis       Denies kidney stone symptoms.     Benign Prostatic Hypertrophy       Patient here for year check. Nocturia 2-3x. Urinary frequency 1x every 2 hours. Weak urinary stream. He is not on Alpha blocker.     Elevated PSA       No recent PSA. Patient has lost 20lbs over the couple years. He is trying to eat more but cannot gain weight. Denies bone pain.           Physical Exam      TODAYS LAB RESULTS:     PVR 33 mL      === 10/19/22 ===     CT Urography w 3D Volume Rendered Imaging     - Impression -  IMPRESSION:  1.  3 mm right inferior pole nonobstructing calculus. No  hydronephrosis.  2. Right simple renal cysts.  3. Moderate circumferential urinary bladder wall thickening  concerning for cystitis.  4. Heterogeneous markedly enlarged prostate gland.  5. 2.7 cm infrarenal aortic ectasia.        No recent PSA - there is an order from Dr. Hightower in  PSA 01/11/2021  6.80     POC Glucose, Urine  NEGATIVE mg/dl NEGATIVE   POC Bilirubin, Urine  NEGATIVE NEGATIVE   POC Ketones, Urine  NEGATIVE mg/dl NEGATIVE   POC Specific Gravity, Urine  1.005 - 1.035 1.025   POC Blood, Urine  NEGATIVE MODERATE (2+) Abnormal    POC PH, Urine  No Reference Range Established PH 6.5   POC Protein, Urine  NEGATIVE, 30 (1+) mg/dl 30 (1+)   POC Urobilinogen,  Urine  0.2, 1.0 EU/DL 0.2   Poc Nitrite, Urine  NEGATIVE NEGATIVE   POC Leukocytes, Urine  NEGATIVE TRACE Abnormal       ASSESSMENT&PLAN:        IMPRESSIONS:     7/19/23 Dr. Campos   pt left without being seen - will reschedule         12/13/22  1. History of gross hematuria  Negative CT urogram and cystoscopy 10/2022  No interval symptoms     2. Abdominal pain  LLQ pain   Doing a lot of pushups- does not exacerbate   No bowel symptoms or constipation      3. BPH  On tamsulosin 0.4 mg qHS - needs refill today   Decided to hold off on finasteride      4. Kidney stone  3 mm right lower pole non-obstructing stone        10/26/22  1. Gross hematuria  Here for cystoscopy  s/p CT urogram 10/19/22  Images and report reviewed. Bladder wall thickening; small non-obstructing stone      2. Kidney stone  3 mm right lower pole non-obstructing stone     3. Right simple renal cysts  No follow up required     4. BPH without obstruction  On tamsulosin 0.4 mg qHS   Had Davidson catheter inserted by JYZ for retention 10/6/22  Passed void trial during recent hospital admission for chest pain  feels that he is voiding well   9/28/22  NEW PATIENT  1. Gross hematuria  Seen 9/25/22 [place] ED for finger injury  Incidentally mentioned several days of gross hematuria  UA from ED - large blood, many RBC, no pyuria or evidence of UTI  Onset: 1 week ago  Duration: intermittent for the past week.  Timing: intermittent   Voiding symptoms: denies any frequency, urgency, dysuria.  Flank pain? history of low back - denies any recent changes  Fevers/chills? None  Anticoagulation status: Takes Aspirin 81 mg 3 times per week - due to h/o heart attack   History:   History of UTI? 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 Complaining bladder spasm     Patient went to ER last night because he was not able to urinate. They out a catheter in and patient now has complaints of pain and blood in his urine. He said that something does not feel right with the catheter.      Exam: Davidson catheter in place, urine clear yellow, manually irrigated without problem     CT abdomen and pelvis 10/5/2022  IMPRESSION:  Davidson catheter in decompressed bladder. Ill-defined bladder wall  thickening suggests element of cystitis. Clinical correlation  recommended. Markedly enlarged prostate gland likely indenting the  bladder.     Small nonobstructive right kidney stone.     Mild distal colon diverticulosis.     3 cm distal abdominal aortic aneurysm.     There is a small cystic lesion noted within pancreatic tail measuring  1.1 cm. Attention on follow-up imaging (contrast enhanced MRI or  pancreas protocol CT) every two years upto 10 years is recommended to  ensure stability. In event of interval growth, more frequent  follow-up imaging versus Endoscopic ultrasound/fine needle aspiration  may be performed, based on size and rate of growth of lesion.  (Reggie AJ, Rodrigo ME, Jc DE, et al. Management of Incidental  Pancreatic Cysts: A White Paper of the ACR Incidental Findings  Committee. J Am Guilherme Radiol. 2017;14(7):911-923.) PANCREAS.ACR.[email or web address]     There is a right renal lesion, measuring3.6 cm, demonstrating  homogeneous attenuation and thin wall with no internal  septation/calcification or mural nodule. Although, not fully  characterized, the internal low attenuation of -10 to 20 HU, favors  underlying benign etiology/simple cyst.  (Soham BR, Bijal SG, Riley NM, et al. Management of the  Incidental Renal Mass on CT: A White Paper of the ACR Incidental  Findings Committee. J Am Guilherme Radiol. 2018;15(2):264-273.)  [email or web address]     Other findings as described above.     We discussed the gross hematuria, pending cystoscopy  We discussed  benign prostate hypertrophy, urinary retention, continue Davidson catheter  We discussed Flomax, Pyridium and a Norco  All the questions were answered, the patient expressed understanding and agreed to the plan.     Impression  Urinary retention, PVR more than 2000 cc  BPH  Gross hematuria  Bladder spasm     Plan  Continue Davidson catheter  Pending cystoscopy  Pyridium 200 mg 3 times a day for 7 days  Flomax 0.4 mg daily x30 days  Norco x20  Await cystoscopy     9/28/22  NEW PATIENT  1. Gross hematuria  Seen 9/25/22 [place] ED for finger injury  Incidentally mentioned several days of gross hematuria  UA from ED - large blood, many RBC, no pyuria or evidence of UTI  Onset: 1 week ago  Duration: intermittent for the past week.  Timing: intermittent   Voiding symptoms: denies any frequency, urgency, dysuria.  Flank pain? history of low back - denies any recent changes  Fevers/chills? None  Anticoagulation status: Takes Aspirin 81 mg 3 times per week - due to h/o heart attack   History:   History of UTI? 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     Impression  Elevated PSA negative biopsy  BPH  Dysuria  nocturia      Plan:  Watch voiding  Yearly ALCON and PSA           04/23/2021  Patient was positioned left side down decubitus position.      10 cc 1% lidocaine was injected locally under ultrasound guidance.     Prostate ultrasound was performed and volume was measured.   then prostate needle biopsy was performed and  following specimen was obtained:  Left lateral apex, left lateral mid, left lateral base, left apex, left mid, left base;  Right apex, right mid, right base, right lateral apex, right lateral mid, right lateral base     Patient tolerated the procedure well and with minimal bleeding.     Pain level  0/10 before  2/10 after     We discussed the post biopsy instructions  All the questions were answered, the patient expressed understanding and agreed to the plan.     Impression  Elevated PSA  BPH  Dysuria  nocturia      Plan:  Await pathology  Appointment in 2 weeks        03/08/21   70-year-old gentleman presents and elevated PSA 6.8, also complained some voiding symptoms, nocturia 2-3     Patient has no nausea, no vomiting, no fever.     ALCON: Deferred     PSA 1/17/21, 6.80     Patient is here for an elevated PSA .     We talked about the meaning of PSA elevation, possible prostate cancer, we discussed transrectal ultrasound-guided prostate biopsy. The risk and the benefits were discussed. Patient agreed to proceed a biopsy.     Impression  Elevated PSA  BPH  Dysuria  nocturia      Plan:  Transrectal ultrasound-guided prostate biopsy;  Cipro 500 mg twice a day Ã--3 days started day before biopsy;  Fleets enema used 2 hours before biopsy;  Return to office 2 weeks after biopsy        PSA  2/25/2025 6.56  1/17/21, 6.80        Surgery  4/23/2021 prostate biopsy negative

## 2025-03-05 ENCOUNTER — ANESTHESIA EVENT (OUTPATIENT)
Dept: GASTROENTEROLOGY | Facility: HOSPITAL | Age: 75
End: 2025-03-05

## 2025-03-05 ENCOUNTER — APPOINTMENT (OUTPATIENT)
Dept: AUDIOLOGY | Facility: HOSPITAL | Age: 75
End: 2025-03-05
Payer: MEDICARE

## 2025-03-07 ENCOUNTER — OFFICE VISIT (OUTPATIENT)
Dept: PULMONOLOGY | Facility: HOSPITAL | Age: 75
End: 2025-03-07
Payer: MEDICARE

## 2025-03-07 VITALS
HEIGHT: 69 IN | OXYGEN SATURATION: 98 % | BODY MASS INDEX: 22.22 KG/M2 | SYSTOLIC BLOOD PRESSURE: 109 MMHG | DIASTOLIC BLOOD PRESSURE: 68 MMHG | TEMPERATURE: 97.9 F | RESPIRATION RATE: 16 BRPM | HEART RATE: 58 BPM | WEIGHT: 150 LBS

## 2025-03-07 DIAGNOSIS — J44.9 CHRONIC OBSTRUCTIVE PULMONARY DISEASE, UNSPECIFIED COPD TYPE (MULTI): Primary | ICD-10-CM

## 2025-03-07 DIAGNOSIS — F17.210 CIGARETTE SMOKER: ICD-10-CM

## 2025-03-07 PROCEDURE — 99213 OFFICE O/P EST LOW 20 MIN: CPT | Performed by: NURSE PRACTITIONER

## 2025-03-07 PROCEDURE — 1157F ADVNC CARE PLAN IN RCRD: CPT | Performed by: NURSE PRACTITIONER

## 2025-03-07 PROCEDURE — 1159F MED LIST DOCD IN RCRD: CPT | Performed by: NURSE PRACTITIONER

## 2025-03-07 PROCEDURE — 3008F BODY MASS INDEX DOCD: CPT | Performed by: NURSE PRACTITIONER

## 2025-03-07 PROCEDURE — 1123F ACP DISCUSS/DSCN MKR DOCD: CPT | Performed by: NURSE PRACTITIONER

## 2025-03-07 ASSESSMENT — ENCOUNTER SYMPTOMS
FEVER: 0
FATIGUE: 0
WHEEZING: 0
COUGH: 0
UNEXPECTED WEIGHT CHANGE: 0
SHORTNESS OF BREATH: 1
CHILLS: 0
RHINORRHEA: 0

## 2025-03-07 NOTE — PATIENT INSTRUCTIONS
Continue on Breztri 2 puffs twice a day, everyday.   Continue albuterol inhaler as needed.  Please make an appointment to see the cardiologist.   Call with any questions or concerns.   Follow up with me in 9 months.

## 2025-03-07 NOTE — PROGRESS NOTES
Subjective   Patient ID: James Zhu is a 74 y.o. male who presents for follow up COPD.     HPI: Patient has PMH of COPD and nicotine dependence. He was referred for COPD management. He states that he gets shortness of breath on exertion at times but improves with rest. He is currently taking Trelegy which he does not feel is helping him. He also has albuterol but does not use it often. He denies any sinus drainage and congestion on a daily basis. He states that he has a productive cough and will hear himself wheezing at times. He denies any fever, chills, chest pain, leg swelling, or hemoptysis. He is a current smoker at 1.5 ppd and has smoked for the past 57 years at 1-2 ppd. 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     Today he is here for follow up. He states that his breathing has been okay now that he is on Breztri. He has a daily productive cough, mostly in the mornings. He does use albuterol prn. He is smoking at 1.5 ppd. He has on other concerns.     Review of Systems   Constitutional:  Negative for chills, fatigue, fever and unexpected weight change.   HENT:  Negative for congestion, postnasal drip and rhinorrhea.    Respiratory:  Positive for shortness of breath. Negative for cough (denies hemoptysis.) and wheezing.    Cardiovascular:  Negative for chest pain and leg swelling.   All other systems reviewed and are negative.      Objective   Physical Exam  Vitals reviewed.   Constitutional:       Appearance: Normal appearance.   HENT:      Head: Normocephalic.   Cardiovascular:      Rate and Rhythm: Normal rate and regular rhythm.   Pulmonary:      Effort: Pulmonary effort is normal.      Breath sounds: Decreased breath sounds present.   Skin:     General: Skin is warm and dry.   Neurological:      Mental Status: He is alert.       Assessment/Plan   1. COPD/asthma overlap  2. Nicotine dependence      Plan:     -PFTs done in July 2021 with FEV1/FVC 57, FEV1 49-61 significant  BDR, . Discussed with patient at length that he has COPD/asthma overlap and that he is in the moderate category. He does not feel that daily inhalers are helping him and is unsure he needs them. He stopped taking maintenance inhalers due to cost. Pharmacy referral was placed and he is now on Breztri BID.     -AAT normal, IGE, RAST negative. Eos 240.     -He is a current smoker at 2 ppd he has smoked for the past 57 years at 1-2 ppd. Recommend complete cessation.     -LDCT done on 12/31 with scattered tiny nodules and emphysematous changes. He did not get repeat LDCT, he had CT chest done in February with stable lung nodules. LDCT done February 2025 was also stable, we will continue annual screening February 2026, will order on follow up.     -Referral placed to cardiology for CAD on LDCT chest, he states he had heart attacks in his 40s then again at age 61. He has not seen a cardiologist in a few years.     Overall I will continue current regimen as his breathing has significantly improved on Breztri he is no longer wheezing on exam. I will see him back in 9 months. I instructed patient to call sooner if needed.      Total time:  25 min.

## 2025-03-10 ENCOUNTER — ANESTHESIA (OUTPATIENT)
Dept: GASTROENTEROLOGY | Facility: HOSPITAL | Age: 75
End: 2025-03-10

## 2025-03-10 ENCOUNTER — APPOINTMENT (OUTPATIENT)
Dept: GASTROENTEROLOGY | Facility: HOSPITAL | Age: 75
End: 2025-03-10
Payer: MEDICARE

## 2025-03-14 ENCOUNTER — APPOINTMENT (OUTPATIENT)
Dept: CARDIOLOGY | Facility: HOSPITAL | Age: 75
End: 2025-03-14
Payer: MEDICARE

## 2025-03-18 ENCOUNTER — HOSPITAL ENCOUNTER (OUTPATIENT)
Dept: CARDIOLOGY | Facility: HOSPITAL | Age: 75
Discharge: HOME | End: 2025-03-18
Payer: MEDICARE

## 2025-03-18 DIAGNOSIS — I70.0 ATHEROSCLEROSIS OF AORTA (CMS-HCC): ICD-10-CM

## 2025-03-18 DIAGNOSIS — I77.810 THORACIC AORTIC ECTASIA (CMS-HCC): ICD-10-CM

## 2025-03-18 DIAGNOSIS — I77.819 DILATATION OF AORTA (CMS-HCC): ICD-10-CM

## 2025-03-18 PROCEDURE — 93356 MYOCRD STRAIN IMG SPCKL TRCK: CPT | Performed by: INTERNAL MEDICINE

## 2025-03-18 PROCEDURE — 93356 MYOCRD STRAIN IMG SPCKL TRCK: CPT

## 2025-03-18 PROCEDURE — 93306 TTE W/DOPPLER COMPLETE: CPT | Performed by: INTERNAL MEDICINE

## 2025-03-19 LAB
AORTIC VALVE MEAN GRADIENT: 7 MMHG
AORTIC VALVE PEAK VELOCITY: 1.75 M/S
AV PEAK GRADIENT: 12 MMHG
AVA (PEAK VEL): 2.17 CM2
AVA (VTI): 1.96 CM2
EJECTION FRACTION APICAL 4 CHAMBER: 55.3
EJECTION FRACTION: 61 %
GLOBAL LONGITUDINAL STRAIN: 18.6 %
LEFT ATRIUM VOLUME AREA LENGTH INDEX BSA: 23.3 ML/M2
LEFT VENTRICLE INTERNAL DIMENSION DIASTOLE: 4.56 CM (ref 3.5–6)
LEFT VENTRICULAR OUTFLOW TRACT DIAMETER: 2.33 CM
LV EJECTION FRACTION BIPLANE: 61 %
MITRAL VALVE E/A RATIO: 0.82
RIGHT VENTRICLE FREE WALL PEAK S': 11.4 CM/S
TRICUSPID ANNULAR PLANE SYSTOLIC EXCURSION: 1.9 CM

## 2025-04-01 ENCOUNTER — TELEPHONE (OUTPATIENT)
Dept: PRIMARY CARE | Facility: CLINIC | Age: 75
End: 2025-04-01
Payer: MEDICARE

## 2025-04-01 DIAGNOSIS — I35.0 MILD AORTIC VALVE STENOSIS: Primary | ICD-10-CM

## 2025-04-01 DIAGNOSIS — I51.7 LVH (LEFT VENTRICULAR HYPERTROPHY): ICD-10-CM

## 2025-04-01 NOTE — TELEPHONE ENCOUNTER
----- Message from Elizabeth Painter sent at 4/1/2025  3:55 PM EDT -----  Echo shows mild dysfunction of left ventricle with normal artrial filling pressure. Moderate septal and mild left ventricular muscle wall thickening with mild aortic valve stenosis. Keep BP well under control, continue with heart healthy diets. I also will put a referral in for cardiology to see if there are any other recommended treatments.

## 2025-04-30 ENCOUNTER — TELEPHONE (OUTPATIENT)
Dept: CARDIOLOGY | Facility: HOSPITAL | Age: 75
End: 2025-04-30
Payer: MEDICARE

## 2025-04-30 NOTE — TELEPHONE ENCOUNTER
Shea sent me a message stating patient's referral was for Dr. Willis, called patient letting him know he can keep Dr. Del Cid's apt or I can schedule him with MM, waiting to hear from patient

## 2025-05-15 ENCOUNTER — HOSPITAL ENCOUNTER (OUTPATIENT)
Dept: NEUROLOGY | Facility: HOSPITAL | Age: 75
Discharge: HOME | End: 2025-05-15
Payer: MEDICARE

## 2025-05-15 DIAGNOSIS — G62.9 NEUROPATHY: ICD-10-CM

## 2025-05-15 DIAGNOSIS — M54.2 CERVICAL PAIN (NECK): ICD-10-CM

## 2025-05-15 PROCEDURE — 95909 NRV CNDJ TST 5-6 STUDIES: CPT | Performed by: PSYCHIATRY & NEUROLOGY

## 2025-05-15 PROCEDURE — 95886 MUSC TEST DONE W/N TEST COMP: CPT | Performed by: PSYCHIATRY & NEUROLOGY

## 2025-05-19 ENCOUNTER — TELEPHONE (OUTPATIENT)
Dept: CARDIOLOGY | Facility: HOSPITAL | Age: 75
End: 2025-05-19

## 2025-05-19 ENCOUNTER — APPOINTMENT (OUTPATIENT)
Dept: PHARMACY | Facility: HOSPITAL | Age: 75
End: 2025-05-19
Payer: MEDICARE

## 2025-05-19 DIAGNOSIS — J44.9 CHRONIC OBSTRUCTIVE PULMONARY DISEASE, UNSPECIFIED COPD TYPE (MULTI): ICD-10-CM

## 2025-05-19 PROCEDURE — RXMED WILLOW AMBULATORY MEDICATION CHARGE

## 2025-05-19 RX ORDER — BUDESONIDE, GLYCOPYRROLATE, AND FORMOTEROL FUMARATE 160; 9; 4.8 UG/1; UG/1; UG/1
2 AEROSOL, METERED RESPIRATORY (INHALATION)
Qty: 32.1 G | Refills: 3 | Status: SHIPPED | OUTPATIENT
Start: 2025-05-19

## 2025-05-19 NOTE — PROGRESS NOTES
Pharmacist Clinic: Pulmonary Management    James Zhu is a 74 y.o. male was referred to Clinical Pharmacy Team for Pulmonary assessment.   Referring Provider: Jemma Vyas APRN-C*  Last visit: 6/7/24  Next visit: 12/5/25    Subjective   No Known Allergies    HPI    PULMONARY ASSESSMENT  Patient has been diagnosed with: COPD and Asthma    Current Regimen:  Breztri 2 puffs twice daily  Albuterol HFA PRN    Historical Treatment:  Trelegy (cost/DPI was hard to use)  Symbicort (cost)    Symptom Management:  Current symptoms: dyspnea, cough, and wheezing  Triggers: exertion/hot weather  Alleviating factors: albuterol HFA    Exacerbation Hx:  When was your last hospitalization for an exacerbation? None recently  When was the last time you were treated with antibiotics and/or steroids? None recently     Rescue Inhaler Use:  How often do you use your rescue inhaler? 3-4x weekly    Appropriate Inhaler Technique:  How do you use your rescue inhaler?  No issues  How do you use your maintenance inhaler?  Struggled previously with DPI    Smoking history  - He currently smokes 2 packs per day.   - History of 1-2 ppd x 57 years    Objective   There were no vitals taken for this visit.    Secondary Prevention (vaccines):  -Influenza: Date [n/a]  -PCV13: Date [n/a]  -PPSV23: Date [n/a]  -PCV20: Date [n/a]  -COVID: Date [n/a]  -RSV: Date [n/a]  -TDAP: Date [10/2/17]  -Shingrix: Date [n/a]    Pulmonary Functions Testing Results:        Lab Review  Lab Results   Component Value Date    BILITOT 0.5 02/23/2024    CALCIUM 9.9 02/23/2024    CO2 25 02/23/2024     02/23/2024    CREATININE 0.97 02/23/2024    GLUCOSE 96 02/23/2024    ALKPHOS 85 02/23/2024    K 3.9 02/23/2024    PROT 8.0 02/23/2024     02/23/2024    AST 21 02/23/2024    ALT 21 02/23/2024    BUN 11 02/23/2024    ANIONGAP 15 02/23/2024    MG 2.11 02/23/2024    PHOS 2.8 02/23/2024    ALBUMIN 4.7 02/23/2024    GFRMALE 71 11/30/2022     Hemoglobin   Date Value Ref  Range Status   02/23/2024 16.1 13.5 - 17.5 g/dL Final     WBC   Date Value Ref Range Status   02/23/2024 10.9 4.4 - 11.3 x10*3/uL Final     Platelets   Date Value Ref Range Status   02/23/2024 219 150 - 450 x10*3/uL Final       The ASCVD Risk score (Renan LARSEN, et al., 2019) failed to calculate for the following reasons:    Risk score cannot be calculated because patient has a medical history suggesting prior/existing ASCVD    Current Outpatient Medications   Medication Instructions    albuterol 90 mcg/actuation inhaler Every 6 hours    aspirin 81 mg EC tablet 1 tablet, Daily    atorvastatin (LIPITOR) 20 mg, oral, Daily    budesonide-glycopyr-formoterol (Breztri Aerosphere) 160-9-4.8 mcg/actuation HFA aerosol inhaler 2 puffs, inhalation, 2 times daily RT    cholecalciferol (VITAMIN D-3) 25 mcg, Daily    cyanocobalamin (VITAMIN B-12) 500 mcg, Daily    ketoconazole (NIZOral) 2 % cream 2 times daily    magnesium citrate,mag oxide 400 mg, oral, Daily    meloxicam (MOBIC) 15 mg, oral, Daily PRN, Take with food    omega 3-dha-epa-fish oil (Fish OiL) 1,000 (120-180) mg capsule Take by mouth.    pantoprazole (PROTONIX) 40 mg, oral, Daily, Do not crush, chew, or split.    terbinafine (LamISIL) 250 mg tablet 1 tablet, Daily (0630)     Drug Interactions:  None    Affordability/Accessibility:  Patient has had issues in past with inhaler cost; Breztri cov'd $47/month    Assessment/Plan   Problem List Items Addressed This Visit    None  Visit Diagnoses         Codes      Chronic obstructive pulmonary disease, unspecified COPD type (Multi)     J44.9    Relevant Medications    budesonide-glycopyr-formoterol (Breztri Aerosphere) 160-9-4.8 mcg/actuation HFA aerosol inhaler    Other Relevant Orders    Referral to Clinical Pharmacy            ASSESSMENT:  Patient with moderate COPD/asthma overlap that is well controlled on current therapy. He is doing well with Breztri so far. He does voice that this summer has been more difficult to  tolerate, including dyspnea at rest.        PLAN:  CONTINUE Breztri 2 puffs twice daily.  Patient can use spacer with this. He also should ensure to always rinse mouth and spit after use d/t ICS + risk of thrush.  Smoking cessation: Recommended 21 mg patch plus 4 mg lozenge  Counseled on admin and side effect mgmt  Patient did voice understanding of benefit as well as desire to make a quit attempt  CONTINUE albuterol HFA PRN  Extensively educated patient on purpose of maintenance inhaler vs rescue inhaler  Recommend patient receive the following vaccinations based on CDC guidelines:  Influenza, COVID, RSV, PSV20, Shingrix  Follow up: 3 months 8/11/25 @ 1320     Patient Assistance Screening (VAF)    Patient verbally reports monthly or yearly income which is less than 400% federal poverty level     Application for program has been submitted for the following medications: Breztri    Patient has been informed that program team will be reaching out to them to discuss necessary documentation, instructed to answer phone/return voicemail.     Patient aware this process may take up to 6 weeks.     If approved medication must be filled through [facility] pharmacy and may be picked up or mailed to patient.       Ananth Salinas RPh      Continue all meds under the continuation of care with the referring provider and clinical pharmacy team.    Verbal consent to manage patient's drug therapy was obtained from the patient. They were informed they may decline to participate or withdraw from participation in pharmacy services at any time.

## 2025-05-20 ENCOUNTER — APPOINTMENT (OUTPATIENT)
Dept: PRIMARY CARE | Facility: CLINIC | Age: 75
End: 2025-05-20
Payer: MEDICARE

## 2025-05-20 ENCOUNTER — TELEPHONE (OUTPATIENT)
Dept: PRIMARY CARE | Facility: CLINIC | Age: 75
End: 2025-05-20

## 2025-05-20 NOTE — TELEPHONE ENCOUNTER
----- Message from Elizabeth Painter sent at 5/20/2025 10:45 AM EDT -----  EMG test shows bilateral L5 radiculopathy without any nerve compression. Negative for large fiber polyneuropathy, right lumbosacral plexopathy, right sciatic neuropathy, right common peroneal   neuropathy and/or myopathy.   ----- Message -----  From: Gaby Castillo MA  Sent: 5/16/2025   6:48 AM EDT  To: HENNA Mcknight-CNP      ----- Message -----  From: Adolph, Neuro Results In  Sent: 5/15/2025   8:16 PM EDT  To: Flavia Hightower DO

## 2025-05-20 NOTE — TELEPHONE ENCOUNTER
Patient asked if we found the problem or is he stuck with it for the rest of his life?  Can it be fixed?

## 2025-05-21 ENCOUNTER — PHARMACY VISIT (OUTPATIENT)
Dept: PHARMACY | Facility: CLINIC | Age: 75
End: 2025-05-21
Payer: COMMERCIAL

## 2025-06-04 ENCOUNTER — APPOINTMENT (OUTPATIENT)
Dept: PRIMARY CARE | Facility: CLINIC | Age: 75
End: 2025-06-04
Payer: MEDICARE

## 2025-06-04 VITALS
BODY MASS INDEX: 22.07 KG/M2 | HEART RATE: 64 BPM | HEIGHT: 69 IN | WEIGHT: 149 LBS | OXYGEN SATURATION: 98 % | SYSTOLIC BLOOD PRESSURE: 142 MMHG | DIASTOLIC BLOOD PRESSURE: 74 MMHG

## 2025-06-04 DIAGNOSIS — J44.1 COPD EXACERBATION (MULTI): Chronic | ICD-10-CM

## 2025-06-04 DIAGNOSIS — K59.00 CONSTIPATION, UNSPECIFIED CONSTIPATION TYPE: ICD-10-CM

## 2025-06-04 DIAGNOSIS — R39.12 BENIGN PROSTATIC HYPERPLASIA WITH WEAK URINARY STREAM: ICD-10-CM

## 2025-06-04 DIAGNOSIS — G31.84 MILD COGNITIVE IMPAIRMENT: Primary | ICD-10-CM

## 2025-06-04 DIAGNOSIS — N40.1 BENIGN PROSTATIC HYPERPLASIA WITH WEAK URINARY STREAM: ICD-10-CM

## 2025-06-04 DIAGNOSIS — R03.0 ELEVATED BP WITHOUT DIAGNOSIS OF HYPERTENSION: ICD-10-CM

## 2025-06-04 PROCEDURE — 1160F RVW MEDS BY RX/DR IN RCRD: CPT

## 2025-06-04 PROCEDURE — 1159F MED LIST DOCD IN RCRD: CPT

## 2025-06-04 PROCEDURE — 99214 OFFICE O/P EST MOD 30 MIN: CPT

## 2025-06-04 PROCEDURE — 3008F BODY MASS INDEX DOCD: CPT

## 2025-06-04 ASSESSMENT — ENCOUNTER SYMPTOMS
SLEEP DISTURBANCE: 1
DEPRESSION: 1
SHORTNESS OF BREATH: 0
LOSS OF SENSATION IN FEET: 1
DYSURIA: 0
CONFUSION: 1
CONSTIPATION: 1
OCCASIONAL FEELINGS OF UNSTEADINESS: 0
HEMATURIA: 0

## 2025-06-04 ASSESSMENT — PATIENT HEALTH QUESTIONNAIRE - PHQ9
2. FEELING DOWN, DEPRESSED OR HOPELESS: SEVERAL DAYS
SUM OF ALL RESPONSES TO PHQ9 QUESTIONS 1 AND 2: 1
1. LITTLE INTEREST OR PLEASURE IN DOING THINGS: NOT AT ALL

## 2025-06-04 ASSESSMENT — COLUMBIA-SUICIDE SEVERITY RATING SCALE - C-SSRS
2. HAVE YOU ACTUALLY HAD ANY THOUGHTS OF KILLING YOURSELF?: NO
6. HAVE YOU EVER DONE ANYTHING, STARTED TO DO ANYTHING, OR PREPARED TO DO ANYTHING TO END YOUR LIFE?: NO
1. IN THE PAST MONTH, HAVE YOU WISHED YOU WERE DEAD OR WISHED YOU COULD GO TO SLEEP AND NOT WAKE UP?: NO

## 2025-06-04 NOTE — ASSESSMENT & PLAN NOTE
-Follows with Urology   Orders:    Follow Up In [facility] - PCP - Established; Future
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
## 2025-06-04 NOTE — ASSESSMENT & PLAN NOTE
-BP elevated in office, has a machine at home but does not check BP  -Advised patient to check BP at home, keep a log, and call if readings consistently >130/80  -Discussed low sodium diet  -Work on stress management   Orders:    Follow Up In [facility] - PCP - Established; Future

## 2025-06-10 ENCOUNTER — APPOINTMENT (OUTPATIENT)
Dept: PRIMARY CARE | Facility: CLINIC | Age: 75
End: 2025-06-10
Payer: MEDICARE

## 2025-08-11 ENCOUNTER — APPOINTMENT (OUTPATIENT)
Dept: PHARMACY | Facility: HOSPITAL | Age: 75
End: 2025-08-11
Payer: MEDICARE

## 2025-08-12 ENCOUNTER — TELEMEDICINE (OUTPATIENT)
Dept: PHARMACY | Facility: HOSPITAL | Age: 75
End: 2025-08-12
Payer: MEDICARE

## 2025-08-12 DIAGNOSIS — J44.1 COPD EXACERBATION (MULTI): Chronic | ICD-10-CM

## 2025-08-12 DIAGNOSIS — F17.210 CIGARETTE NICOTINE DEPENDENCE WITHOUT COMPLICATION: Primary | Chronic | ICD-10-CM

## 2025-08-22 ENCOUNTER — TELEPHONE (OUTPATIENT)
Dept: CARDIOLOGY | Facility: HOSPITAL | Age: 75
End: 2025-08-22
Payer: MEDICARE

## 2025-08-26 ENCOUNTER — OFFICE VISIT (OUTPATIENT)
Dept: CARDIOLOGY | Facility: HOSPITAL | Age: 75
End: 2025-08-26
Payer: MEDICARE

## 2025-08-26 VITALS
BODY MASS INDEX: 20.62 KG/M2 | HEART RATE: 66 BPM | HEIGHT: 70 IN | SYSTOLIC BLOOD PRESSURE: 112 MMHG | DIASTOLIC BLOOD PRESSURE: 70 MMHG | WEIGHT: 144 LBS

## 2025-08-26 DIAGNOSIS — R03.0 ELEVATED BP WITHOUT DIAGNOSIS OF HYPERTENSION: ICD-10-CM

## 2025-08-26 DIAGNOSIS — I21.9 MYOCARDIAL INFARCTION, UNSPECIFIED MI TYPE, UNSPECIFIED ARTERY (MULTI): Primary | ICD-10-CM

## 2025-08-26 DIAGNOSIS — I51.7 LVH (LEFT VENTRICULAR HYPERTROPHY): ICD-10-CM

## 2025-08-26 DIAGNOSIS — R06.02 SHORTNESS OF BREATH: ICD-10-CM

## 2025-08-26 DIAGNOSIS — R07.89 ATYPICAL CHEST PAIN: ICD-10-CM

## 2025-08-26 DIAGNOSIS — E78.5 DYSLIPIDEMIA: ICD-10-CM

## 2025-08-26 DIAGNOSIS — R53.83 OTHER FATIGUE: ICD-10-CM

## 2025-08-26 DIAGNOSIS — F17.210 CIGARETTE NICOTINE DEPENDENCE WITHOUT COMPLICATION: Chronic | ICD-10-CM

## 2025-08-26 DIAGNOSIS — I35.8 AORTIC VALVE SCLEROSIS: ICD-10-CM

## 2025-08-26 DIAGNOSIS — Z95.5 S/P CORONARY ARTERY STENT PLACEMENT: ICD-10-CM

## 2025-08-26 DIAGNOSIS — I70.0 ATHEROSCLEROSIS OF AORTA: ICD-10-CM

## 2025-08-26 LAB
ATRIAL RATE: 66 BPM
P AXIS: 82 DEGREES
P OFFSET: 205 MS
P ONSET: 156 MS
PR INTERVAL: 134 MS
Q ONSET: 223 MS
QRS COUNT: 11 BEATS
QRS DURATION: 82 MS
QT INTERVAL: 378 MS
QTC CALCULATION(BAZETT): 396 MS
QTC FREDERICIA: 390 MS
R AXIS: 85 DEGREES
T AXIS: 49 DEGREES
T OFFSET: 412 MS
VENTRICULAR RATE: 66 BPM

## 2025-08-26 PROCEDURE — 99205 OFFICE O/P NEW HI 60 MIN: CPT | Performed by: INTERNAL MEDICINE

## 2025-08-26 PROCEDURE — 99213 OFFICE O/P EST LOW 20 MIN: CPT

## 2025-08-26 PROCEDURE — 1159F MED LIST DOCD IN RCRD: CPT | Performed by: INTERNAL MEDICINE

## 2025-08-26 PROCEDURE — 3008F BODY MASS INDEX DOCD: CPT | Performed by: INTERNAL MEDICINE

## 2025-08-26 PROCEDURE — 1160F RVW MEDS BY RX/DR IN RCRD: CPT | Performed by: INTERNAL MEDICINE

## 2025-08-26 PROCEDURE — 93005 ELECTROCARDIOGRAM TRACING: CPT | Performed by: INTERNAL MEDICINE

## 2025-08-26 RX ORDER — ATORVASTATIN CALCIUM 40 MG/1
40 TABLET, FILM COATED ORAL DAILY
Qty: 90 TABLET | Refills: 3 | Status: SHIPPED | OUTPATIENT
Start: 2025-08-26 | End: 2026-08-26

## 2025-08-26 ASSESSMENT — ENCOUNTER SYMPTOMS
DEPRESSION: 1
LOSS OF SENSATION IN FEET: 1
OCCASIONAL FEELINGS OF UNSTEADINESS: 0

## 2025-09-04 ENCOUNTER — TELEPHONE (OUTPATIENT)
Dept: CARDIOLOGY | Facility: HOSPITAL | Age: 75
End: 2025-09-04
Payer: MEDICARE

## 2025-09-12 ENCOUNTER — APPOINTMENT (OUTPATIENT)
Dept: PRIMARY CARE | Facility: CLINIC | Age: 75
End: 2025-09-12
Payer: MEDICARE

## 2025-09-17 ENCOUNTER — APPOINTMENT (OUTPATIENT)
Dept: PRIMARY CARE | Facility: CLINIC | Age: 75
End: 2025-09-17
Payer: MEDICARE

## 2025-09-22 ENCOUNTER — APPOINTMENT (OUTPATIENT)
Dept: PRIMARY CARE | Facility: CLINIC | Age: 75
End: 2025-09-22
Payer: MEDICARE

## 2025-10-10 ENCOUNTER — APPOINTMENT (OUTPATIENT)
Dept: CARDIOLOGY | Facility: HOSPITAL | Age: 75
End: 2025-10-10
Payer: MEDICARE

## 2025-10-20 ENCOUNTER — APPOINTMENT (OUTPATIENT)
Dept: PHARMACY | Facility: HOSPITAL | Age: 75
End: 2025-10-20
Payer: MEDICARE

## 2025-12-05 ENCOUNTER — APPOINTMENT (OUTPATIENT)
Dept: PULMONOLOGY | Facility: HOSPITAL | Age: 75
End: 2025-12-05
Payer: MEDICARE

## 2026-02-19 ENCOUNTER — APPOINTMENT (OUTPATIENT)
Dept: PRIMARY CARE | Facility: CLINIC | Age: 76
End: 2026-02-19
Payer: MEDICARE

## 2026-02-27 ENCOUNTER — APPOINTMENT (OUTPATIENT)
Dept: UROLOGY | Facility: CLINIC | Age: 76
End: 2026-02-27
Payer: MEDICARE